# Patient Record
Sex: FEMALE
[De-identification: names, ages, dates, MRNs, and addresses within clinical notes are randomized per-mention and may not be internally consistent; named-entity substitution may affect disease eponyms.]

---

## 2024-09-04 ENCOUNTER — NON-APPOINTMENT (OUTPATIENT)
Age: 24
End: 2024-09-04

## 2024-09-04 ENCOUNTER — APPOINTMENT (OUTPATIENT)
Dept: OBGYN | Facility: CLINIC | Age: 24
End: 2024-09-04
Payer: MEDICAID

## 2024-09-04 ENCOUNTER — OUTPATIENT (OUTPATIENT)
Dept: OUTPATIENT SERVICES | Facility: HOSPITAL | Age: 24
LOS: 1 days | End: 2024-09-04
Payer: MEDICAID

## 2024-09-04 VITALS
TEMPERATURE: 97.7 F | DIASTOLIC BLOOD PRESSURE: 79 MMHG | HEART RATE: 77 BPM | RESPIRATION RATE: 18 BRPM | OXYGEN SATURATION: 99 % | HEIGHT: 61 IN | SYSTOLIC BLOOD PRESSURE: 123 MMHG | BODY MASS INDEX: 21.14 KG/M2 | WEIGHT: 112 LBS

## 2024-09-04 DIAGNOSIS — Z34.00 ENCOUNTER FOR SUPERVISION OF NORMAL FIRST PREGNANCY, UNSPECIFIED TRIMESTER: ICD-10-CM

## 2024-09-04 DIAGNOSIS — Z34.91 ENCOUNTER FOR SUPERVISION OF NORMAL PREGNANCY, UNSPECIFIED, FIRST TRIMESTER: ICD-10-CM

## 2024-09-04 DIAGNOSIS — L30.9 DERMATITIS, UNSPECIFIED: ICD-10-CM

## 2024-09-04 DIAGNOSIS — D51.9 VITAMIN B12 DEFICIENCY ANEMIA, UNSPECIFIED: ICD-10-CM

## 2024-09-04 DIAGNOSIS — Z81.8 FAMILY HISTORY OF OTHER MENTAL AND BEHAVIORAL DISORDERS: ICD-10-CM

## 2024-09-04 PROBLEM — Z00.00 ENCOUNTER FOR PREVENTIVE HEALTH EXAMINATION: Status: ACTIVE | Noted: 2024-09-04

## 2024-09-04 PROCEDURE — 99203 OFFICE O/P NEW LOW 30 MIN: CPT

## 2024-09-04 PROCEDURE — 80053 COMPREHEN METABOLIC PANEL: CPT

## 2024-09-04 PROCEDURE — 86778 TOXOPLASMA ANTIBODY IGM: CPT

## 2024-09-04 PROCEDURE — 87389 HIV-1 AG W/HIV-1&-2 AB AG IA: CPT

## 2024-09-04 PROCEDURE — 81003 URINALYSIS AUTO W/O SCOPE: CPT

## 2024-09-04 PROCEDURE — 86480 TB TEST CELL IMMUN MEASURE: CPT

## 2024-09-04 PROCEDURE — 87340 HEPATITIS B SURFACE AG IA: CPT

## 2024-09-04 PROCEDURE — 83020 HEMOGLOBIN ELECTROPHORESIS: CPT

## 2024-09-04 PROCEDURE — 83036 HEMOGLOBIN GLYCOSYLATED A1C: CPT

## 2024-09-04 PROCEDURE — 36415 COLL VENOUS BLD VENIPUNCTURE: CPT

## 2024-09-04 PROCEDURE — 85025 COMPLETE CBC W/AUTO DIFF WBC: CPT

## 2024-09-04 PROCEDURE — 86900 BLOOD TYPING SEROLOGIC ABO: CPT

## 2024-09-04 PROCEDURE — 81243 FMR1 GEN ALY DETC ABNL ALLEL: CPT

## 2024-09-04 PROCEDURE — 83655 ASSAY OF LEAD: CPT

## 2024-09-04 PROCEDURE — 86850 RBC ANTIBODY SCREEN: CPT

## 2024-09-04 PROCEDURE — 84443 ASSAY THYROID STIM HORMONE: CPT

## 2024-09-04 PROCEDURE — 87086 URINE CULTURE/COLONY COUNT: CPT

## 2024-09-04 PROCEDURE — 81025 URINE PREGNANCY TEST: CPT

## 2024-09-04 PROCEDURE — 86747 PARVOVIRUS ANTIBODY: CPT

## 2024-09-04 PROCEDURE — 81220 CFTR GENE COM VARIANTS: CPT

## 2024-09-04 PROCEDURE — 81329 SMN1 GENE DOS/DELETION ALYS: CPT

## 2024-09-04 PROCEDURE — G0463: CPT

## 2024-09-04 PROCEDURE — 86765 RUBEOLA ANTIBODY: CPT

## 2024-09-04 PROCEDURE — 86777 TOXOPLASMA ANTIBODY: CPT

## 2024-09-04 PROCEDURE — 86735 MUMPS ANTIBODY: CPT

## 2024-09-04 PROCEDURE — 86762 RUBELLA ANTIBODY: CPT

## 2024-09-04 PROCEDURE — 86787 VARICELLA-ZOSTER ANTIBODY: CPT

## 2024-09-04 PROCEDURE — 81420 FETAL CHRMOML ANEUPLOIDY: CPT

## 2024-09-04 PROCEDURE — 86803 HEPATITIS C AB TEST: CPT

## 2024-09-04 PROCEDURE — 86780 TREPONEMA PALLIDUM: CPT

## 2024-09-04 RX ORDER — PNV NO.95/FERROUS FUM/FOLIC AC 28MG-0.8MG
TABLET ORAL
Refills: 0 | Status: ACTIVE | COMMUNITY

## 2024-09-04 RX ORDER — PYRIDOXINE HCL (VITAMIN B6) 25 MG
25 TABLET ORAL EVERY 8 HOURS
Qty: 45 | Refills: 0 | Status: ACTIVE | COMMUNITY
Start: 2024-09-04 | End: 1900-01-01

## 2024-09-04 RX ORDER — METOCLOPRAMIDE 5 MG/1
5 TABLET ORAL TWICE DAILY
Qty: 30 | Refills: 0 | Status: ACTIVE | COMMUNITY
Start: 2024-09-04 | End: 1900-01-01

## 2024-09-04 RX ORDER — .BETA.-CAROTENE, ASCORBIC ACID, CHOLECALCIFEROL, .ALPHA.-TOCOPHEROL ACETATE, DL-, THIAMINE MONONITRATE, RIBOFLAVIN, NIACINAMIDE, PYRIDOXINE HYDROCHLORIDE, FOLIC ACID, CYANOCOBALAMIN, CALCIUM PANTOTHENATE, CALCIUM CARBONATE, FERROUS FUMARATE, ZINC OXIDE, AND DOCUSATE SODIUM 1000; 100; 400; 30; 3; 3; 15; 20; 1; 12; 7; 200; 29; 20; 25 [IU]/1; MG/1; [IU]/1; [IU]/1; MG/1; MG/1; MG/1; MG/1; MG/1; UG/1; MG/1; MG/1; MG/1; MG/1; MG/1
29-1 TABLET, COATED ORAL DAILY
Qty: 30 | Refills: 11 | Status: ACTIVE | COMMUNITY
Start: 2024-09-04 | End: 1900-01-01

## 2024-09-05 LAB
ALBUMIN SERPL ELPH-MCNC: 4.5 G/DL
ALP BLD-CCNC: 64 U/L
ALT SERPL-CCNC: 7 U/L
ANION GAP SERPL CALC-SCNC: 13 MMOL/L
AST SERPL-CCNC: 16 U/L
BASOPHILS # BLD AUTO: 0.02 K/UL
BASOPHILS NFR BLD AUTO: 0.3 %
BILIRUB SERPL-MCNC: 0.8 MG/DL
BUN SERPL-MCNC: 7 MG/DL
CALCIUM SERPL-MCNC: 10 MG/DL
CHLORIDE SERPL-SCNC: 102 MMOL/L
CO2 SERPL-SCNC: 21 MMOL/L
CREAT SERPL-MCNC: 0.48 MG/DL
EGFR: 134 ML/MIN/1.73M2
EOSINOPHIL # BLD AUTO: 0.04 K/UL
EOSINOPHIL NFR BLD AUTO: 0.5 %
ESTIMATED AVERAGE GLUCOSE: 103 MG/DL
GLUCOSE SERPL-MCNC: 78 MG/DL
HBA1C MFR BLD HPLC: 5.2 %
HBV SURFACE AG SER QL: NONREACTIVE
HCT VFR BLD CALC: 38.1 %
HCV AB SER QL: NONREACTIVE
HCV S/CO RATIO: 0.08 S/CO
HGB A MFR BLD: 97.4 %
HGB A2 MFR BLD: 2.6 %
HGB BLD-MCNC: 13 G/DL
HGB FRACT BLD-IMP: NORMAL
HIV1+2 AB SPEC QL IA.RAPID: NONREACTIVE
IMM GRANULOCYTES NFR BLD AUTO: 0.5 %
LYMPHOCYTES # BLD AUTO: 1.35 K/UL
LYMPHOCYTES NFR BLD AUTO: 17.5 %
MAN DIFF?: NORMAL
MCHC RBC-ENTMCNC: 28.6 PG
MCHC RBC-ENTMCNC: 34.1 GM/DL
MCV RBC AUTO: 83.9 FL
MONOCYTES # BLD AUTO: 0.39 K/UL
MONOCYTES NFR BLD AUTO: 5.1 %
NEUTROPHILS # BLD AUTO: 5.86 K/UL
NEUTROPHILS NFR BLD AUTO: 76.1 %
PLATELET # BLD AUTO: 220 K/UL
POTASSIUM SERPL-SCNC: 3.9 MMOL/L
PROT SERPL-MCNC: 6.9 G/DL
RBC # BLD: 4.54 M/UL
RBC # FLD: 13.1 %
SODIUM SERPL-SCNC: 136 MMOL/L
T PALLIDUM AB SER QL IA: NEGATIVE
TSH SERPL-ACNC: 0.34 UIU/ML
WBC # FLD AUTO: 7.7 K/UL

## 2024-09-06 DIAGNOSIS — Z3A.10 10 WEEKS GESTATION OF PREGNANCY: ICD-10-CM

## 2024-09-06 LAB
ABO + RH PNL BLD: NORMAL
B19V IGG SER QL IA: 0.36 INDEX
B19V IGG+IGM SER-IMP: NEGATIVE
B19V IGG+IGM SER-IMP: NORMAL
B19V IGM FLD-ACNC: 0.2 INDEX
B19V IGM SER-ACNC: NEGATIVE
BACTERIA UR CULT: NORMAL
BLD GP AB SCN SERPL QL: NORMAL
FMR1 GENE MUT ANL BLD/T: NORMAL
LEAD BLD-MCNC: <1 UG/DL
MEV IGG FLD QL IA: 21.8 AU/ML
MEV IGG+IGM SER-IMP: POSITIVE
MUV AB SER-ACNC: POSITIVE
MUV IGG SER QL IA: 72.6 AU/ML
RUBV IGG FLD-ACNC: 0.58 INDEX
RUBV IGG SER-IMP: NEGATIVE
T GONDII AB SER-IMP: NEGATIVE
T GONDII AB SER-IMP: NEGATIVE
T GONDII IGG SER QL: <3 IU/ML
T GONDII IGM SER QL: <3 AU/ML
VZV AB TITR SER: POSITIVE
VZV IGG SER IF-ACNC: 589.6 INDEX

## 2024-09-09 LAB
M TB IFN-G BLD-IMP: NEGATIVE
QUANTIFERON TB PLUS MITOGEN MINUS NIL: >10 IU/ML
QUANTIFERON TB PLUS NIL: 0.03 IU/ML
QUANTIFERON TB PLUS TB1 MINUS NIL: 0 IU/ML
QUANTIFERON TB PLUS TB2 MINUS NIL: 0 IU/ML

## 2024-09-11 LAB — AR GENE MUT ANL BLD/T: NORMAL

## 2024-09-13 LAB
CFTR MUT TESTED BLD/T: NEGATIVE
CHROMOSOME13 INTERPRETATION: NORMAL
CHROMOSOME13 TEST RESULT: NORMAL
CHROMOSOME18 INTERPRETATION: NORMAL
CHROMOSOME18 TEST RESULT: NORMAL
CHROMOSOME21 INTERPRETATION: NORMAL
CHROMOSOME21 TEST RESULT: NORMAL
FETAL FRACTION: NORMAL
PERFORMANCE AND LIMITATIONS: NORMAL
SEX CHROMOSOME INTERPRETATION: NORMAL
SEX CHROMOSOME TEST RESULT: NORMAL
VERIFI PRENATAL TEST: NOT DETECTED

## 2024-09-18 ENCOUNTER — TRANSCRIPTION ENCOUNTER (OUTPATIENT)
Age: 24
End: 2024-09-18

## 2024-09-23 ENCOUNTER — APPOINTMENT (OUTPATIENT)
Dept: ANTEPARTUM | Facility: CLINIC | Age: 24
End: 2024-09-23
Payer: MEDICAID

## 2024-09-23 ENCOUNTER — ASOB RESULT (OUTPATIENT)
Age: 24
End: 2024-09-23

## 2024-09-23 PROCEDURE — 76813 OB US NUCHAL MEAS 1 GEST: CPT | Mod: 59

## 2024-09-23 PROCEDURE — 76801 OB US < 14 WKS SINGLE FETUS: CPT

## 2024-10-02 ENCOUNTER — OUTPATIENT (OUTPATIENT)
Dept: OUTPATIENT SERVICES | Facility: HOSPITAL | Age: 24
LOS: 1 days | End: 2024-10-02

## 2024-10-02 ENCOUNTER — APPOINTMENT (OUTPATIENT)
Dept: OBGYN | Facility: CLINIC | Age: 24
End: 2024-10-02
Payer: MEDICAID

## 2024-10-02 VITALS
DIASTOLIC BLOOD PRESSURE: 81 MMHG | SYSTOLIC BLOOD PRESSURE: 122 MMHG | TEMPERATURE: 98.1 F | BODY MASS INDEX: 21.14 KG/M2 | RESPIRATION RATE: 18 BRPM | HEIGHT: 61 IN | HEART RATE: 93 BPM | WEIGHT: 112 LBS | OXYGEN SATURATION: 99 %

## 2024-10-02 DIAGNOSIS — Z34.00 ENCOUNTER FOR SUPERVISION OF NORMAL FIRST PREGNANCY, UNSPECIFIED TRIMESTER: ICD-10-CM

## 2024-10-02 PROCEDURE — 99213 OFFICE O/P EST LOW 20 MIN: CPT

## 2024-10-02 PROCEDURE — 81003 URINALYSIS AUTO W/O SCOPE: CPT

## 2024-10-02 PROCEDURE — G0463: CPT

## 2024-10-09 ENCOUNTER — NON-APPOINTMENT (OUTPATIENT)
Age: 24
End: 2024-10-09

## 2024-11-05 PROBLEM — Z78.9 OTHER SPECIFIED HEALTH STATUS: Chronic | Status: ACTIVE | Noted: 2024-11-04

## 2024-11-06 ENCOUNTER — OUTPATIENT (OUTPATIENT)
Dept: OUTPATIENT SERVICES | Facility: HOSPITAL | Age: 24
LOS: 1 days | End: 2024-11-06

## 2024-11-06 ENCOUNTER — APPOINTMENT (OUTPATIENT)
Dept: OBGYN | Facility: CLINIC | Age: 24
End: 2024-11-06
Payer: MEDICAID

## 2024-11-06 VITALS
BODY MASS INDEX: 20.77 KG/M2 | DIASTOLIC BLOOD PRESSURE: 83 MMHG | SYSTOLIC BLOOD PRESSURE: 122 MMHG | HEIGHT: 61 IN | HEART RATE: 105 BPM | OXYGEN SATURATION: 98 % | TEMPERATURE: 97.9 F | WEIGHT: 110 LBS

## 2024-11-06 DIAGNOSIS — O99.012 ANEMIA COMPLICATING PREGNANCY, SECOND TRIMESTER: ICD-10-CM

## 2024-11-06 DIAGNOSIS — Z34.00 ENCOUNTER FOR SUPERVISION OF NORMAL FIRST PREGNANCY, UNSPECIFIED TRIMESTER: ICD-10-CM

## 2024-11-06 PROCEDURE — G0463: CPT

## 2024-11-06 PROCEDURE — 99213 OFFICE O/P EST LOW 20 MIN: CPT

## 2024-11-06 PROCEDURE — 82105 ALPHA-FETOPROTEIN SERUM: CPT

## 2024-11-06 PROCEDURE — 81003 URINALYSIS AUTO W/O SCOPE: CPT

## 2024-11-07 LAB
AFP INTERP SERPL-IMP: NORMAL
AFP INTERP SERPL-IMP: NORMAL
AFP MOM CUT-OFF: 2.5
AFP MOM SERPL: 1.4
AFP PERCENTILE: 85.3
AFP SERPL-ACNC: 92.22 NG/ML
CARBAMAZEPINE?: NO
CURRENT SMOKER: NORMAL
DIABETES STATUS PATIENT: NORMAL
GA: NORMAL
GESTATIONAL AGE METHOD: NORMAL
HX OF NTD NARR: NORMAL
MULTIPLE PREGNANCY: NORMAL
NEURAL TUBE DEFECT RISK FETUS: NORMAL
NEURAL TUBE DEFECT RISK POP: NORMAL
RECOM F/U: NO
TEST PERFORMANCE INFO SPEC: NORMAL
VALPROIC ACID?: NORMAL

## 2024-11-20 ENCOUNTER — ASOB RESULT (OUTPATIENT)
Age: 24
End: 2024-11-20

## 2024-11-20 ENCOUNTER — APPOINTMENT (OUTPATIENT)
Dept: ANTEPARTUM | Facility: CLINIC | Age: 24
End: 2024-11-20
Payer: MEDICAID

## 2024-11-20 ENCOUNTER — NON-APPOINTMENT (OUTPATIENT)
Age: 24
End: 2024-11-20

## 2024-11-20 PROCEDURE — 76817 TRANSVAGINAL US OBSTETRIC: CPT

## 2024-11-20 PROCEDURE — 76811 OB US DETAILED SNGL FETUS: CPT

## 2024-11-27 ENCOUNTER — ASOB RESULT (OUTPATIENT)
Age: 24
End: 2024-11-27

## 2024-11-27 ENCOUNTER — APPOINTMENT (OUTPATIENT)
Dept: ANTEPARTUM | Facility: CLINIC | Age: 24
End: 2024-11-27
Payer: MEDICAID

## 2024-11-27 PROCEDURE — 76815 OB US LIMITED FETUS(S): CPT

## 2024-11-27 PROCEDURE — 76817 TRANSVAGINAL US OBSTETRIC: CPT

## 2024-12-03 ENCOUNTER — NON-APPOINTMENT (OUTPATIENT)
Age: 24
End: 2024-12-03

## 2024-12-04 ENCOUNTER — APPOINTMENT (OUTPATIENT)
Dept: OBGYN | Facility: CLINIC | Age: 24
End: 2024-12-04
Payer: MEDICAID

## 2024-12-04 ENCOUNTER — OUTPATIENT (OUTPATIENT)
Dept: OUTPATIENT SERVICES | Facility: HOSPITAL | Age: 24
LOS: 1 days | End: 2024-12-04
Payer: MEDICAID

## 2024-12-04 VITALS
TEMPERATURE: 98.1 F | HEART RATE: 95 BPM | RESPIRATION RATE: 18 BRPM | SYSTOLIC BLOOD PRESSURE: 112 MMHG | BODY MASS INDEX: 22.47 KG/M2 | WEIGHT: 119 LBS | HEIGHT: 61 IN | OXYGEN SATURATION: 95 % | DIASTOLIC BLOOD PRESSURE: 71 MMHG

## 2024-12-04 DIAGNOSIS — Z34.00 ENCOUNTER FOR SUPERVISION OF NORMAL FIRST PREGNANCY, UNSPECIFIED TRIMESTER: ICD-10-CM

## 2024-12-04 PROCEDURE — G0463: CPT

## 2024-12-04 PROCEDURE — 99213 OFFICE O/P EST LOW 20 MIN: CPT

## 2024-12-04 PROCEDURE — 81003 URINALYSIS AUTO W/O SCOPE: CPT

## 2024-12-05 DIAGNOSIS — Z34.92 ENCOUNTER FOR SUPERVISION OF NORMAL PREGNANCY, UNSPECIFIED, SECOND TRIMESTER: ICD-10-CM

## 2024-12-05 DIAGNOSIS — Z3A.28 28 WEEKS GESTATION OF PREGNANCY: ICD-10-CM

## 2025-01-03 ENCOUNTER — OUTPATIENT (OUTPATIENT)
Dept: OUTPATIENT SERVICES | Facility: HOSPITAL | Age: 25
LOS: 1 days | End: 2025-01-03
Payer: MEDICAID

## 2025-01-03 ENCOUNTER — APPOINTMENT (OUTPATIENT)
Dept: OBGYN | Facility: CLINIC | Age: 25
End: 2025-01-03
Payer: MEDICAID

## 2025-01-03 VITALS
TEMPERATURE: 97.1 F | HEIGHT: 61 IN | HEART RATE: 88 BPM | OXYGEN SATURATION: 100 % | SYSTOLIC BLOOD PRESSURE: 111 MMHG | WEIGHT: 122 LBS | RESPIRATION RATE: 18 BRPM | DIASTOLIC BLOOD PRESSURE: 70 MMHG | BODY MASS INDEX: 23.03 KG/M2

## 2025-01-03 DIAGNOSIS — Z87.2 PERSONAL HISTORY OF DISEASES OF THE SKIN AND SUBCUTANEOUS TISSUE: ICD-10-CM

## 2025-01-03 DIAGNOSIS — O99.012 ANEMIA COMPLICATING PREGNANCY, SECOND TRIMESTER: ICD-10-CM

## 2025-01-03 DIAGNOSIS — Z00.00 ENCOUNTER FOR GENERAL ADULT MEDICAL EXAMINATION WITHOUT ABNORMAL FINDINGS: ICD-10-CM

## 2025-01-03 DIAGNOSIS — Z86.2 PERSONAL HISTORY OF DISEASES OF THE BLOOD AND BLOOD-FORMING ORGANS AND CERTAIN DISORDERS INVOLVING THE IMMUNE MECHANISM: ICD-10-CM

## 2025-01-03 DIAGNOSIS — Z71.85 ENCOUNTER FOR IMMUNIZATION SAFETY COUNSELING: ICD-10-CM

## 2025-01-03 DIAGNOSIS — Z34.91 ENCOUNTER FOR SUPERVISION OF NORMAL PREGNANCY, UNSPECIFIED, FIRST TRIMESTER: ICD-10-CM

## 2025-01-03 PROCEDURE — G0463: CPT

## 2025-01-03 PROCEDURE — 36415 COLL VENOUS BLD VENIPUNCTURE: CPT

## 2025-01-03 PROCEDURE — 82950 GLUCOSE TEST: CPT

## 2025-01-03 PROCEDURE — 81003 URINALYSIS AUTO W/O SCOPE: CPT

## 2025-01-03 PROCEDURE — 83036 HEMOGLOBIN GLYCOSYLATED A1C: CPT

## 2025-01-03 PROCEDURE — 86780 TREPONEMA PALLIDUM: CPT

## 2025-01-03 PROCEDURE — 85025 COMPLETE CBC W/AUTO DIFF WBC: CPT

## 2025-01-03 PROCEDURE — 99214 OFFICE O/P EST MOD 30 MIN: CPT

## 2025-01-06 LAB
BASOPHILS # BLD AUTO: 0.01 K/UL
BASOPHILS NFR BLD AUTO: 0.1 %
EOSINOPHIL # BLD AUTO: 0.09 K/UL
EOSINOPHIL NFR BLD AUTO: 1.2 %
ESTIMATED AVERAGE GLUCOSE: 91 MG/DL
GLUCOSE 1H P 50 G GLC PO SERPL-MCNC: 106 MG/DL
HBA1C MFR BLD HPLC: 4.8 %
HCT VFR BLD CALC: 33.7 %
HGB BLD-MCNC: 11.3 G/DL
IMM GRANULOCYTES NFR BLD AUTO: 1.2 %
LYMPHOCYTES # BLD AUTO: 1.24 K/UL
LYMPHOCYTES NFR BLD AUTO: 16.9 %
MAN DIFF?: NORMAL
MCHC RBC-ENTMCNC: 29.5 PG
MCHC RBC-ENTMCNC: 33.5 G/DL
MCV RBC AUTO: 88 FL
MONOCYTES # BLD AUTO: 0.39 K/UL
MONOCYTES NFR BLD AUTO: 5.3 %
NEUTROPHILS # BLD AUTO: 5.51 K/UL
NEUTROPHILS NFR BLD AUTO: 75.3 %
PLATELET # BLD AUTO: 213 K/UL
RBC # BLD: 3.83 M/UL
RBC # FLD: 13 %
T PALLIDUM AB SER QL IA: NEGATIVE
WBC # FLD AUTO: 7.33 K/UL

## 2025-01-07 DIAGNOSIS — O99.012 ANEMIA COMPLICATING PREGNANCY, SECOND TRIMESTER: ICD-10-CM

## 2025-01-07 DIAGNOSIS — Z3A.27 27 WEEKS GESTATION OF PREGNANCY: ICD-10-CM

## 2025-02-05 ENCOUNTER — APPOINTMENT (OUTPATIENT)
Dept: ANTEPARTUM | Facility: CLINIC | Age: 25
End: 2025-02-05
Payer: MEDICAID

## 2025-02-05 ENCOUNTER — ASOB RESULT (OUTPATIENT)
Age: 25
End: 2025-02-05

## 2025-02-05 ENCOUNTER — APPOINTMENT (OUTPATIENT)
Dept: OBGYN | Facility: CLINIC | Age: 25
End: 2025-02-05
Payer: MEDICAID

## 2025-02-05 ENCOUNTER — OUTPATIENT (OUTPATIENT)
Dept: OUTPATIENT SERVICES | Facility: HOSPITAL | Age: 25
LOS: 1 days | End: 2025-02-05
Payer: MEDICAID

## 2025-02-05 VITALS
SYSTOLIC BLOOD PRESSURE: 108 MMHG | RESPIRATION RATE: 18 BRPM | BODY MASS INDEX: 24.35 KG/M2 | HEART RATE: 95 BPM | TEMPERATURE: 97.7 F | OXYGEN SATURATION: 99 % | WEIGHT: 129 LBS | HEIGHT: 61 IN | DIASTOLIC BLOOD PRESSURE: 68 MMHG

## 2025-02-05 DIAGNOSIS — Z34.00 ENCOUNTER FOR SUPERVISION OF NORMAL FIRST PREGNANCY, UNSPECIFIED TRIMESTER: ICD-10-CM

## 2025-02-05 DIAGNOSIS — Z23 ENCOUNTER FOR IMMUNIZATION: ICD-10-CM

## 2025-02-05 DIAGNOSIS — O99.012 ANEMIA COMPLICATING PREGNANCY, SECOND TRIMESTER: ICD-10-CM

## 2025-02-05 PROCEDURE — 87389 HIV-1 AG W/HIV-1&-2 AB AG IA: CPT

## 2025-02-05 PROCEDURE — 90715 TDAP VACCINE 7 YRS/> IM: CPT

## 2025-02-05 PROCEDURE — 85025 COMPLETE CBC W/AUTO DIFF WBC: CPT

## 2025-02-05 PROCEDURE — 99213 OFFICE O/P EST LOW 20 MIN: CPT

## 2025-02-05 PROCEDURE — 76816 OB US FOLLOW-UP PER FETUS: CPT

## 2025-02-05 PROCEDURE — G0463: CPT

## 2025-02-05 PROCEDURE — 90715 TDAP VACCINE 7 YRS/> IM: CPT | Mod: NC

## 2025-02-05 PROCEDURE — 81003 URINALYSIS AUTO W/O SCOPE: CPT

## 2025-02-05 PROCEDURE — 76819 FETAL BIOPHYS PROFIL W/O NST: CPT | Mod: 59

## 2025-02-06 LAB — HIV1+2 AB SPEC QL IA.RAPID: NONREACTIVE

## 2025-02-10 DIAGNOSIS — Z23 ENCOUNTER FOR IMMUNIZATION: ICD-10-CM

## 2025-02-10 DIAGNOSIS — Z3A.32 32 WEEKS GESTATION OF PREGNANCY: ICD-10-CM

## 2025-02-10 DIAGNOSIS — O99.012 ANEMIA COMPLICATING PREGNANCY, SECOND TRIMESTER: ICD-10-CM

## 2025-02-12 ENCOUNTER — NON-APPOINTMENT (OUTPATIENT)
Age: 25
End: 2025-02-12

## 2025-02-21 ENCOUNTER — APPOINTMENT (OUTPATIENT)
Dept: OBGYN | Facility: CLINIC | Age: 25
End: 2025-02-21
Payer: MEDICAID

## 2025-02-21 VITALS
HEART RATE: 97 BPM | BODY MASS INDEX: 25.13 KG/M2 | WEIGHT: 133 LBS | SYSTOLIC BLOOD PRESSURE: 126 MMHG | DIASTOLIC BLOOD PRESSURE: 87 MMHG | OXYGEN SATURATION: 98 %

## 2025-02-21 PROCEDURE — 0502F SUBSEQUENT PRENATAL CARE: CPT

## 2025-03-07 ENCOUNTER — APPOINTMENT (OUTPATIENT)
Dept: OBGYN | Facility: CLINIC | Age: 25
End: 2025-03-07
Payer: MEDICAID

## 2025-03-07 ENCOUNTER — LABORATORY RESULT (OUTPATIENT)
Age: 25
End: 2025-03-07

## 2025-03-07 ENCOUNTER — NON-APPOINTMENT (OUTPATIENT)
Age: 25
End: 2025-03-07

## 2025-03-07 VITALS
OXYGEN SATURATION: 99 % | DIASTOLIC BLOOD PRESSURE: 85 MMHG | HEART RATE: 90 BPM | BODY MASS INDEX: 25.13 KG/M2 | WEIGHT: 133 LBS | SYSTOLIC BLOOD PRESSURE: 126 MMHG

## 2025-03-07 PROCEDURE — 0502F SUBSEQUENT PRENATAL CARE: CPT

## 2025-03-14 ENCOUNTER — APPOINTMENT (OUTPATIENT)
Dept: OBGYN | Facility: CLINIC | Age: 25
End: 2025-03-14
Payer: MEDICAID

## 2025-03-14 VITALS
DIASTOLIC BLOOD PRESSURE: 83 MMHG | SYSTOLIC BLOOD PRESSURE: 122 MMHG | HEART RATE: 97 BPM | WEIGHT: 134 LBS | BODY MASS INDEX: 25.32 KG/M2 | OXYGEN SATURATION: 98 %

## 2025-03-14 PROCEDURE — 0502F SUBSEQUENT PRENATAL CARE: CPT

## 2025-03-25 ENCOUNTER — APPOINTMENT (OUTPATIENT)
Dept: OBGYN | Facility: CLINIC | Age: 25
End: 2025-03-25
Payer: MEDICAID

## 2025-03-25 VITALS — BODY MASS INDEX: 26.45 KG/M2 | WEIGHT: 140 LBS | SYSTOLIC BLOOD PRESSURE: 145 MMHG | DIASTOLIC BLOOD PRESSURE: 93 MMHG

## 2025-03-25 PROCEDURE — 0502F SUBSEQUENT PRENATAL CARE: CPT

## 2025-03-28 ENCOUNTER — NON-APPOINTMENT (OUTPATIENT)
Age: 25
End: 2025-03-28

## 2025-03-31 ENCOUNTER — APPOINTMENT (OUTPATIENT)
Dept: OBGYN | Facility: CLINIC | Age: 25
End: 2025-03-31
Payer: MEDICAID

## 2025-03-31 ENCOUNTER — INPATIENT (INPATIENT)
Facility: HOSPITAL | Age: 25
LOS: 2 days | Discharge: HOME | DRG: 951 | End: 2025-04-03
Attending: OBSTETRICS & GYNECOLOGY | Admitting: OBSTETRICS & GYNECOLOGY
Payer: MEDICAID

## 2025-03-31 VITALS
HEIGHT: 61 IN | DIASTOLIC BLOOD PRESSURE: 79 MMHG | OXYGEN SATURATION: 95 % | RESPIRATION RATE: 17 BRPM | SYSTOLIC BLOOD PRESSURE: 137 MMHG | WEIGHT: 141.98 LBS | TEMPERATURE: 98 F | HEART RATE: 81 BPM

## 2025-03-31 VITALS
DIASTOLIC BLOOD PRESSURE: 95 MMHG | WEIGHT: 142 LBS | BODY MASS INDEX: 26.83 KG/M2 | OXYGEN SATURATION: 99 % | SYSTOLIC BLOOD PRESSURE: 143 MMHG | HEART RATE: 71 BPM

## 2025-03-31 DIAGNOSIS — Z34.80 ENCOUNTER FOR SUPERVISION OF OTHER NORMAL PREGNANCY, UNSPECIFIED TRIMESTER: ICD-10-CM

## 2025-03-31 DIAGNOSIS — O26.899 OTHER SPECIFIED PREGNANCY RELATED CONDITIONS, UNSPECIFIED TRIMESTER: ICD-10-CM

## 2025-03-31 DIAGNOSIS — Z98.890 OTHER SPECIFIED POSTPROCEDURAL STATES: Chronic | ICD-10-CM

## 2025-03-31 LAB
ALBUMIN SERPL ELPH-MCNC: 2.5 G/DL — LOW (ref 3.5–5)
ALP SERPL-CCNC: 285 U/L — HIGH (ref 40–120)
ALT FLD-CCNC: 20 U/L DA — SIGNIFICANT CHANGE UP (ref 10–60)
ANION GAP SERPL CALC-SCNC: 7 MMOL/L — SIGNIFICANT CHANGE UP (ref 5–17)
APPEARANCE UR: CLEAR — SIGNIFICANT CHANGE UP
APTT BLD: 33.2 SEC — SIGNIFICANT CHANGE UP (ref 24.5–35.6)
AST SERPL-CCNC: 24 U/L — SIGNIFICANT CHANGE UP (ref 10–40)
BASOPHILS # BLD AUTO: 0.02 K/UL — SIGNIFICANT CHANGE UP (ref 0–0.2)
BASOPHILS NFR BLD AUTO: 0.3 % — SIGNIFICANT CHANGE UP (ref 0–2)
BILIRUB SERPL-MCNC: 0.4 MG/DL — SIGNIFICANT CHANGE UP (ref 0.2–1.2)
BILIRUB UR-MCNC: NEGATIVE — SIGNIFICANT CHANGE UP
BUN SERPL-MCNC: 13 MG/DL — SIGNIFICANT CHANGE UP (ref 7–18)
CALCIUM SERPL-MCNC: 8.7 MG/DL — SIGNIFICANT CHANGE UP (ref 8.4–10.5)
CHLORIDE SERPL-SCNC: 108 MMOL/L — SIGNIFICANT CHANGE UP (ref 96–108)
CO2 SERPL-SCNC: 23 MMOL/L — SIGNIFICANT CHANGE UP (ref 22–31)
COLOR SPEC: YELLOW — SIGNIFICANT CHANGE UP
CREAT ?TM UR-MCNC: 177 MG/DL — SIGNIFICANT CHANGE UP
CREAT SERPL-MCNC: 0.55 MG/DL — SIGNIFICANT CHANGE UP (ref 0.5–1.3)
DIFF PNL FLD: NEGATIVE — SIGNIFICANT CHANGE UP
EGFR: 130 ML/MIN/1.73M2 — SIGNIFICANT CHANGE UP
EGFR: 130 ML/MIN/1.73M2 — SIGNIFICANT CHANGE UP
EOSINOPHIL # BLD AUTO: 0.06 K/UL — SIGNIFICANT CHANGE UP (ref 0–0.5)
EOSINOPHIL NFR BLD AUTO: 1 % — SIGNIFICANT CHANGE UP (ref 0–6)
FIBRINOGEN PPP-MCNC: 332 MG/DL — SIGNIFICANT CHANGE UP (ref 200–475)
GLUCOSE SERPL-MCNC: 98 MG/DL — SIGNIFICANT CHANGE UP (ref 70–99)
GLUCOSE UR QL: NEGATIVE MG/DL — SIGNIFICANT CHANGE UP
HCT VFR BLD CALC: 36 % — SIGNIFICANT CHANGE UP (ref 34.5–45)
HGB BLD-MCNC: 12.3 G/DL — SIGNIFICANT CHANGE UP (ref 11.5–15.5)
IMM GRANULOCYTES NFR BLD AUTO: 0.7 % — SIGNIFICANT CHANGE UP (ref 0–0.9)
INR BLD: 0.89 RATIO — SIGNIFICANT CHANGE UP (ref 0.85–1.16)
KETONES UR-MCNC: NEGATIVE MG/DL — SIGNIFICANT CHANGE UP
LDH SERPL L TO P-CCNC: 205 U/L — SIGNIFICANT CHANGE UP (ref 120–225)
LEUKOCYTE ESTERASE UR-ACNC: ABNORMAL
LYMPHOCYTES # BLD AUTO: 1.61 K/UL — SIGNIFICANT CHANGE UP (ref 1–3.3)
LYMPHOCYTES # BLD AUTO: 28 % — SIGNIFICANT CHANGE UP (ref 13–44)
MCHC RBC-ENTMCNC: 28.5 PG — SIGNIFICANT CHANGE UP (ref 27–34)
MCHC RBC-ENTMCNC: 34.2 G/DL — SIGNIFICANT CHANGE UP (ref 32–36)
MCV RBC AUTO: 83.5 FL — SIGNIFICANT CHANGE UP (ref 80–100)
MONOCYTES # BLD AUTO: 0.28 K/UL — SIGNIFICANT CHANGE UP (ref 0–0.9)
MONOCYTES NFR BLD AUTO: 4.9 % — SIGNIFICANT CHANGE UP (ref 2–14)
NEUTROPHILS # BLD AUTO: 3.73 K/UL — SIGNIFICANT CHANGE UP (ref 1.8–7.4)
NEUTROPHILS NFR BLD AUTO: 65.1 % — SIGNIFICANT CHANGE UP (ref 43–77)
NITRITE UR-MCNC: NEGATIVE — SIGNIFICANT CHANGE UP
NRBC BLD AUTO-RTO: 0 /100 WBCS — SIGNIFICANT CHANGE UP (ref 0–0)
PH UR: 7 — SIGNIFICANT CHANGE UP (ref 5–8)
PLATELET # BLD AUTO: 155 K/UL — SIGNIFICANT CHANGE UP (ref 150–400)
POTASSIUM SERPL-MCNC: 3.7 MMOL/L — SIGNIFICANT CHANGE UP (ref 3.5–5.3)
POTASSIUM SERPL-SCNC: 3.7 MMOL/L — SIGNIFICANT CHANGE UP (ref 3.5–5.3)
PROT ?TM UR-MCNC: 189 MG/DL — HIGH (ref 0–12)
PROT SERPL-MCNC: 6 G/DL — SIGNIFICANT CHANGE UP (ref 6–8.3)
PROT UR-MCNC: 300 MG/DL
PROT/CREAT UR-RTO: 1.1 RATIO — HIGH (ref 0–0.2)
PROTHROM AB SERPL-ACNC: 10.3 SEC — SIGNIFICANT CHANGE UP (ref 9.9–13.4)
RBC # BLD: 4.31 M/UL — SIGNIFICANT CHANGE UP (ref 3.8–5.2)
RBC # FLD: 15 % — HIGH (ref 10.3–14.5)
SODIUM SERPL-SCNC: 138 MMOL/L — SIGNIFICANT CHANGE UP (ref 135–145)
SP GR SPEC: 1.02 — SIGNIFICANT CHANGE UP (ref 1–1.03)
URATE SERPL-MCNC: 5.1 MG/DL — SIGNIFICANT CHANGE UP (ref 2.5–7)
UROBILINOGEN FLD QL: 1 MG/DL — SIGNIFICANT CHANGE UP (ref 0.2–1)
WBC # BLD: 5.74 K/UL — SIGNIFICANT CHANGE UP (ref 3.8–10.5)
WBC # FLD AUTO: 5.74 K/UL — SIGNIFICANT CHANGE UP (ref 3.8–10.5)

## 2025-03-31 PROCEDURE — 59425 ANTEPARTUM CARE ONLY: CPT

## 2025-03-31 PROCEDURE — 0502F SUBSEQUENT PRENATAL CARE: CPT

## 2025-03-31 RX ORDER — MAGNESIUM SULFATE 500 MG/ML
4 SYRINGE (ML) INJECTION ONCE
Refills: 0 | Status: COMPLETED | OUTPATIENT
Start: 2025-03-31 | End: 2025-03-31

## 2025-03-31 RX ORDER — SODIUM CHLORIDE 9 G/1000ML
1000 INJECTION, SOLUTION INTRAVENOUS
Refills: 0 | Status: DISCONTINUED | OUTPATIENT
Start: 2025-03-31 | End: 2025-03-31

## 2025-03-31 RX ORDER — OXYTOCIN-SODIUM CHLORIDE 0.9% IV SOLN 30 UNIT/500ML 30-0.9/5 UT/ML-%
SOLUTION INTRAVENOUS
Qty: 30 | Refills: 0 | Status: DISCONTINUED | OUTPATIENT
Start: 2025-03-31 | End: 2025-04-01

## 2025-03-31 RX ORDER — CITRIC ACID/SODIUM CITRATE 300-500 MG
15 SOLUTION, ORAL ORAL EVERY 6 HOURS
Refills: 0 | Status: DISCONTINUED | OUTPATIENT
Start: 2025-03-31 | End: 2025-04-01

## 2025-03-31 RX ORDER — INFLUENZA A VIRUS A/IDAHO/07/2018 (H1N1) ANTIGEN (MDCK CELL DERIVED, PROPIOLACTONE INACTIVATED, INFLUENZA A VIRUS A/INDIANA/08/2018 (H3N2) ANTIGEN (MDCK CELL DERIVED, PROPIOLACTONE INACTIVATED), INFLUENZA B VIRUS B/SINGAPORE/INFTT-16-0610/2016 ANTIGEN (MDCK CELL DERIVED, PROPIOLACTONE INACTIVATED), INFLUENZA B VIRUS B/IOWA/06/2017 ANTIGEN (MDCK CELL DERIVED, PROPIOLACTONE INACTIVATED) 15; 15; 15; 15 UG/.5ML; UG/.5ML; UG/.5ML; UG/.5ML
0.5 INJECTION, SUSPENSION INTRAMUSCULAR ONCE
Refills: 0 | Status: DISCONTINUED | OUTPATIENT
Start: 2025-03-31 | End: 2025-04-03

## 2025-03-31 RX ORDER — SODIUM CHLORIDE 9 G/1000ML
1000 INJECTION, SOLUTION INTRAVENOUS
Refills: 0 | Status: DISCONTINUED | OUTPATIENT
Start: 2025-03-31 | End: 2025-04-03

## 2025-03-31 RX ORDER — OXYTOCIN-SODIUM CHLORIDE 0.9% IV SOLN 30 UNIT/500ML 30-0.9/5 UT/ML-%
167 SOLUTION INTRAVENOUS
Qty: 30 | Refills: 0 | Status: DISCONTINUED | OUTPATIENT
Start: 2025-03-31 | End: 2025-04-02

## 2025-03-31 RX ORDER — MAGNESIUM SULFATE 500 MG/ML
2 SYRINGE (ML) INJECTION
Qty: 40 | Refills: 0 | Status: DISCONTINUED | OUTPATIENT
Start: 2025-03-31 | End: 2025-04-02

## 2025-03-31 RX ADMIN — OXYTOCIN-SODIUM CHLORIDE 0.9% IV SOLN 30 UNIT/500ML 2 MILLIUNIT(S)/MIN: 30-0.9/5 SOLUTION at 21:43

## 2025-03-31 RX ADMIN — SODIUM CHLORIDE 125 MILLILITER(S): 9 INJECTION, SOLUTION INTRAVENOUS at 12:49

## 2025-03-31 RX ADMIN — SODIUM CHLORIDE 125 MILLILITER(S): 9 INJECTION, SOLUTION INTRAVENOUS at 21:45

## 2025-03-31 RX ADMIN — Medication 300 GRAM(S): at 22:25

## 2025-03-31 RX ADMIN — Medication 50 GM/HR: at 22:45

## 2025-03-31 NOTE — OB PROVIDER H&P - NSHPPHYSICALEXAM_GEN_ALL_CORE
146/96    Gen: NAD  Abd: soft, NT, gravid  Ext: no calf tenderness    VE: 2/70/-2    FHT: 150 moderate variability with acces no decels  Kennett: CTX q 3-5 min    BSUS: vertex, anterior placenta    EFW 3300g

## 2025-03-31 NOTE — OB PROVIDER H&P - NSICDXFAMILYHX_GEN_ALL_CORE_FT
FAMILY HISTORY:  Sibling  Still living? Unknown  Family history of autism, Age at diagnosis: Age Unknown

## 2025-03-31 NOTE — OB PROVIDER H&P - HISTORY OF PRESENT ILLNESS
23 yo  at 39.6 wks GA EDC  LMP  presents from office with /95, last week on 3/25 /93 with pproteinuia in office now meeting criteria for PEC w/o SF for IOL. Denies HTN prior to pregnancy. No headache, visual changes. No chest pain or SOB. No N/V. No RUQ/epigastric pain. +UE/Le edema. Denies CTX. no LOF/VB. +FM    PNC With Dr. Tiwari with EC w/o SF as above    POBhx:  denies    PGYNHx: no abnl PAP, no C/F or STI    PMHx: eczema per chart review    PSHx: rhinoplasty     Meds: denies    Allergies: NKDA    SHx: no T/E/D    FHx: brother with autism    ROS: as above

## 2025-03-31 NOTE — OB PROVIDER H&P - ASSESSMENT
23 yo  at 39.6 wks GA IOL for PE w/o SF, GBS neg, cat 1 FHT, mild range BP, asx  - admit to L&D  - fetal monitoring  - IVF  - CBC, type and screen x 2, coags, fibrinogen, RPR  - BP monitoring  - PEC labs  - IOL with PO cytotec  - plan per Dr. Tiwari  - consent to be signed by Dr. Calix (attg in house) 25 yo  at 39.6 wks GA IOL for PE w/o SF, GBS neg, cat 1 FHT, mild range BP, asx  - admit to L&D  - fetal monitoring  - IVF  - CBC, type and screen x 2, coags, fibrinogen, RPR  - BP monitoring  - PEC labs  - IOL with PO cytotec  - plan per Dr. Tiwari  - consent to be signed by Dr. Calix (attg in house)  - MMR PP

## 2025-04-01 ENCOUNTER — NON-APPOINTMENT (OUTPATIENT)
Age: 25
End: 2025-04-01

## 2025-04-01 LAB
ALBUMIN SERPL ELPH-MCNC: 2.4 G/DL — LOW (ref 3.5–5)
ALBUMIN SERPL ELPH-MCNC: 2.4 G/DL — LOW (ref 3.5–5)
ALP SERPL-CCNC: 284 U/L — HIGH (ref 40–120)
ALP SERPL-CCNC: 292 U/L — HIGH (ref 40–120)
ALT FLD-CCNC: 18 U/L DA — SIGNIFICANT CHANGE UP (ref 10–60)
ALT FLD-CCNC: 21 U/L DA — SIGNIFICANT CHANGE UP (ref 10–60)
ANION GAP SERPL CALC-SCNC: 10 MMOL/L — SIGNIFICANT CHANGE UP (ref 5–17)
ANION GAP SERPL CALC-SCNC: 8 MMOL/L — SIGNIFICANT CHANGE UP (ref 5–17)
APTT BLD: 30.3 SEC — SIGNIFICANT CHANGE UP (ref 24.5–35.6)
AST SERPL-CCNC: 25 U/L — SIGNIFICANT CHANGE UP (ref 10–40)
AST SERPL-CCNC: 28 U/L — SIGNIFICANT CHANGE UP (ref 10–40)
BASOPHILS # BLD AUTO: 0.02 K/UL — SIGNIFICANT CHANGE UP (ref 0–0.2)
BASOPHILS # BLD AUTO: 0.05 K/UL — SIGNIFICANT CHANGE UP (ref 0–0.2)
BASOPHILS NFR BLD AUTO: 0.2 % — SIGNIFICANT CHANGE UP (ref 0–2)
BASOPHILS NFR BLD AUTO: 0.3 % — SIGNIFICANT CHANGE UP (ref 0–2)
BILIRUB SERPL-MCNC: 0.4 MG/DL — SIGNIFICANT CHANGE UP (ref 0.2–1.2)
BILIRUB SERPL-MCNC: 0.5 MG/DL — SIGNIFICANT CHANGE UP (ref 0.2–1.2)
BUN SERPL-MCNC: 11 MG/DL — SIGNIFICANT CHANGE UP (ref 7–18)
BUN SERPL-MCNC: 12 MG/DL — SIGNIFICANT CHANGE UP (ref 7–18)
CALCIUM SERPL-MCNC: 8 MG/DL — LOW (ref 8.4–10.5)
CALCIUM SERPL-MCNC: 8 MG/DL — LOW (ref 8.4–10.5)
CHLORIDE SERPL-SCNC: 102 MMOL/L — SIGNIFICANT CHANGE UP (ref 96–108)
CHLORIDE SERPL-SCNC: 105 MMOL/L — SIGNIFICANT CHANGE UP (ref 96–108)
CO2 SERPL-SCNC: 20 MMOL/L — LOW (ref 22–31)
CO2 SERPL-SCNC: 21 MMOL/L — LOW (ref 22–31)
CREAT SERPL-MCNC: 0.61 MG/DL — SIGNIFICANT CHANGE UP (ref 0.5–1.3)
CREAT SERPL-MCNC: 0.8 MG/DL — SIGNIFICANT CHANGE UP (ref 0.5–1.3)
EGFR: 104 ML/MIN/1.73M2 — SIGNIFICANT CHANGE UP
EGFR: 104 ML/MIN/1.73M2 — SIGNIFICANT CHANGE UP
EGFR: 126 ML/MIN/1.73M2 — SIGNIFICANT CHANGE UP
EGFR: 126 ML/MIN/1.73M2 — SIGNIFICANT CHANGE UP
EOSINOPHIL # BLD AUTO: 0 K/UL — SIGNIFICANT CHANGE UP (ref 0–0.5)
EOSINOPHIL # BLD AUTO: 0.01 K/UL — SIGNIFICANT CHANGE UP (ref 0–0.5)
EOSINOPHIL NFR BLD AUTO: 0 % — SIGNIFICANT CHANGE UP (ref 0–6)
EOSINOPHIL NFR BLD AUTO: 0.1 % — SIGNIFICANT CHANGE UP (ref 0–6)
FIBRINOGEN PPP-MCNC: 355 MG/DL — SIGNIFICANT CHANGE UP (ref 200–475)
GLUCOSE SERPL-MCNC: 130 MG/DL — HIGH (ref 70–99)
GLUCOSE SERPL-MCNC: 89 MG/DL — SIGNIFICANT CHANGE UP (ref 70–99)
HCT VFR BLD CALC: 39 % — SIGNIFICANT CHANGE UP (ref 34.5–45)
HCT VFR BLD CALC: 39.5 % — SIGNIFICANT CHANGE UP (ref 34.5–45)
HGB BLD-MCNC: 13.2 G/DL — SIGNIFICANT CHANGE UP (ref 11.5–15.5)
HGB BLD-MCNC: 13.5 G/DL — SIGNIFICANT CHANGE UP (ref 11.5–15.5)
IMM GRANULOCYTES NFR BLD AUTO: 0.5 % — SIGNIFICANT CHANGE UP (ref 0–0.9)
IMM GRANULOCYTES NFR BLD AUTO: 0.5 % — SIGNIFICANT CHANGE UP (ref 0–0.9)
INR BLD: 0.83 RATIO — LOW (ref 0.85–1.16)
LDH SERPL L TO P-CCNC: 253 U/L — HIGH (ref 120–225)
LDH SERPL L TO P-CCNC: 283 U/L — HIGH (ref 120–225)
LYMPHOCYTES # BLD AUTO: 0.79 K/UL — LOW (ref 1–3.3)
LYMPHOCYTES # BLD AUTO: 0.96 K/UL — LOW (ref 1–3.3)
LYMPHOCYTES # BLD AUTO: 4.2 % — LOW (ref 13–44)
LYMPHOCYTES # BLD AUTO: 7.9 % — LOW (ref 13–44)
MAGNESIUM SERPL-MCNC: 4.7 MG/DL — HIGH (ref 1.6–2.6)
MAGNESIUM SERPL-MCNC: 5.5 MG/DL — HIGH (ref 1.6–2.6)
MAGNESIUM SERPL-MCNC: 5.8 MG/DL — HIGH (ref 1.6–2.6)
MAGNESIUM SERPL-MCNC: 5.9 MG/DL — HIGH (ref 1.6–2.6)
MAGNESIUM SERPL-MCNC: 6 MG/DL — HIGH (ref 1.6–2.6)
MAGNESIUM SERPL-MCNC: 6 MG/DL — HIGH (ref 1.6–2.6)
MCHC RBC-ENTMCNC: 28.3 PG — SIGNIFICANT CHANGE UP (ref 27–34)
MCHC RBC-ENTMCNC: 28.5 PG — SIGNIFICANT CHANGE UP (ref 27–34)
MCHC RBC-ENTMCNC: 33.8 G/DL — SIGNIFICANT CHANGE UP (ref 32–36)
MCHC RBC-ENTMCNC: 34.2 G/DL — SIGNIFICANT CHANGE UP (ref 32–36)
MCV RBC AUTO: 83.5 FL — SIGNIFICANT CHANGE UP (ref 80–100)
MCV RBC AUTO: 83.7 FL — SIGNIFICANT CHANGE UP (ref 80–100)
MONOCYTES # BLD AUTO: 0.48 K/UL — SIGNIFICANT CHANGE UP (ref 0–0.9)
MONOCYTES # BLD AUTO: 0.62 K/UL — SIGNIFICANT CHANGE UP (ref 0–0.9)
MONOCYTES NFR BLD AUTO: 3.3 % — SIGNIFICANT CHANGE UP (ref 2–14)
MONOCYTES NFR BLD AUTO: 3.9 % — SIGNIFICANT CHANGE UP (ref 2–14)
NEUTROPHILS # BLD AUTO: 10.65 K/UL — HIGH (ref 1.8–7.4)
NEUTROPHILS # BLD AUTO: 17.07 K/UL — HIGH (ref 1.8–7.4)
NEUTROPHILS NFR BLD AUTO: 87.4 % — HIGH (ref 43–77)
NEUTROPHILS NFR BLD AUTO: 91.7 % — HIGH (ref 43–77)
NRBC BLD AUTO-RTO: 0 /100 WBCS — SIGNIFICANT CHANGE UP (ref 0–0)
NRBC BLD AUTO-RTO: 0 /100 WBCS — SIGNIFICANT CHANGE UP (ref 0–0)
PLATELET # BLD AUTO: 148 K/UL — LOW (ref 150–400)
PLATELET # BLD AUTO: 159 K/UL — SIGNIFICANT CHANGE UP (ref 150–400)
POTASSIUM SERPL-MCNC: 3.6 MMOL/L — SIGNIFICANT CHANGE UP (ref 3.5–5.3)
POTASSIUM SERPL-MCNC: 3.8 MMOL/L — SIGNIFICANT CHANGE UP (ref 3.5–5.3)
POTASSIUM SERPL-SCNC: 3.6 MMOL/L — SIGNIFICANT CHANGE UP (ref 3.5–5.3)
POTASSIUM SERPL-SCNC: 3.8 MMOL/L — SIGNIFICANT CHANGE UP (ref 3.5–5.3)
PROT SERPL-MCNC: 6 G/DL — SIGNIFICANT CHANGE UP (ref 6–8.3)
PROT SERPL-MCNC: 6.1 G/DL — SIGNIFICANT CHANGE UP (ref 6–8.3)
PROTHROM AB SERPL-ACNC: 9.7 SEC — LOW (ref 9.9–13.4)
RBC # BLD: 4.66 M/UL — SIGNIFICANT CHANGE UP (ref 3.8–5.2)
RBC # BLD: 4.73 M/UL — SIGNIFICANT CHANGE UP (ref 3.8–5.2)
RBC # FLD: 14.9 % — HIGH (ref 10.3–14.5)
RBC # FLD: 15.1 % — HIGH (ref 10.3–14.5)
SODIUM SERPL-SCNC: 132 MMOL/L — LOW (ref 135–145)
SODIUM SERPL-SCNC: 134 MMOL/L — LOW (ref 135–145)
T PALLIDUM AB TITR SER: NEGATIVE — SIGNIFICANT CHANGE UP
URATE SERPL-MCNC: 5.2 MG/DL — SIGNIFICANT CHANGE UP (ref 2.5–7)
URATE SERPL-MCNC: 5.6 MG/DL — SIGNIFICANT CHANGE UP (ref 2.5–7)
WBC # BLD: 12.18 K/UL — HIGH (ref 3.8–10.5)
WBC # BLD: 18.62 K/UL — HIGH (ref 3.8–10.5)
WBC # FLD AUTO: 12.18 K/UL — HIGH (ref 3.8–10.5)
WBC # FLD AUTO: 18.62 K/UL — HIGH (ref 3.8–10.5)

## 2025-04-01 PROCEDURE — 59409 OBSTETRICAL CARE: CPT | Mod: U9

## 2025-04-01 RX ORDER — PRENATAL 136/IRON/FOLIC ACID 27 MG-1 MG
1 TABLET ORAL DAILY
Refills: 0 | Status: DISCONTINUED | OUTPATIENT
Start: 2025-04-01 | End: 2025-04-02

## 2025-04-01 RX ORDER — SENNA 187 MG
2 TABLET ORAL DAILY
Refills: 0 | Status: DISCONTINUED | OUTPATIENT
Start: 2025-04-01 | End: 2025-04-02

## 2025-04-01 RX ORDER — DIBUCAINE 10 MG/G
1 OINTMENT TOPICAL EVERY 6 HOURS
Refills: 0 | Status: DISCONTINUED | OUTPATIENT
Start: 2025-04-01 | End: 2025-04-03

## 2025-04-01 RX ORDER — ACETAMINOPHEN 500 MG/5ML
1000 LIQUID (ML) ORAL ONCE
Refills: 0 | Status: COMPLETED | OUTPATIENT
Start: 2025-04-01 | End: 2025-04-01

## 2025-04-01 RX ORDER — SIMETHICONE 80 MG
80 TABLET,CHEWABLE ORAL EVERY 4 HOURS
Refills: 0 | Status: DISCONTINUED | OUTPATIENT
Start: 2025-04-01 | End: 2025-04-03

## 2025-04-01 RX ORDER — LABETALOL HYDROCHLORIDE 200 MG/1
200 TABLET, FILM COATED ORAL ONCE
Refills: 0 | Status: COMPLETED | OUTPATIENT
Start: 2025-04-01 | End: 2025-04-01

## 2025-04-01 RX ORDER — HYDROCORTISONE 10 MG/G
1 CREAM TOPICAL EVERY 6 HOURS
Refills: 0 | Status: DISCONTINUED | OUTPATIENT
Start: 2025-04-01 | End: 2025-04-03

## 2025-04-01 RX ORDER — MODIFIED LANOLIN 100 %
1 CREAM (GRAM) TOPICAL EVERY 6 HOURS
Refills: 0 | Status: DISCONTINUED | OUTPATIENT
Start: 2025-04-01 | End: 2025-04-03

## 2025-04-01 RX ORDER — ACETAMINOPHEN 500 MG/5ML
975 LIQUID (ML) ORAL EVERY 6 HOURS
Refills: 0 | Status: COMPLETED | OUTPATIENT
Start: 2025-04-01 | End: 2026-02-28

## 2025-04-01 RX ORDER — DIPHENHYDRAMINE HCL 12.5MG/5ML
25 ELIXIR ORAL ONCE
Refills: 0 | Status: COMPLETED | OUTPATIENT
Start: 2025-04-01 | End: 2025-04-01

## 2025-04-01 RX ORDER — MAGNESIUM HYDROXIDE 400 MG/5ML
30 SUSPENSION ORAL
Refills: 0 | Status: DISCONTINUED | OUTPATIENT
Start: 2025-04-01 | End: 2025-04-03

## 2025-04-01 RX ORDER — PRAMOXINE HCL 1 %
1 GEL (GRAM) TOPICAL EVERY 4 HOURS
Refills: 0 | Status: DISCONTINUED | OUTPATIENT
Start: 2025-04-01 | End: 2025-04-03

## 2025-04-01 RX ORDER — WITCH HAZEL LEAF
1 FLUID EXTRACT MISCELLANEOUS EVERY 4 HOURS
Refills: 0 | Status: DISCONTINUED | OUTPATIENT
Start: 2025-04-01 | End: 2025-04-03

## 2025-04-01 RX ORDER — DIPHENHYDRAMINE HCL 12.5MG/5ML
25 ELIXIR ORAL EVERY 6 HOURS
Refills: 0 | Status: DISCONTINUED | OUTPATIENT
Start: 2025-04-01 | End: 2025-04-03

## 2025-04-01 RX ORDER — CLOSTRIDIUM TETANI TOXOID ANTIGEN (FORMALDEHYDE INACTIVATED), CORYNEBACTERIUM DIPHTHERIAE TOXOID ANTIGEN (FORMALDEHYDE INACTIVATED), BORDETELLA PERTUSSIS TOXOID ANTIGEN (GLUTARALDEHYDE INACTIVATED), BORDETELLA PERTUSSIS FILAMENTOUS HEMAGGLUTININ ANTIGEN (FORMALDEHYDE INACTIVATED), BORDETELLA PERTUSSIS PERTACTIN ANTIGEN, AND BORDETELLA PERTUSSIS FIMBRIAE 2/3 ANTIGEN 5; 2; 2.5; 5; 3; 5 [LF]/.5ML; [LF]/.5ML; UG/.5ML; UG/.5ML; UG/.5ML; UG/.5ML
0.5 INJECTION, SUSPENSION INTRAMUSCULAR ONCE
Refills: 0 | Status: DISCONTINUED | OUTPATIENT
Start: 2025-04-01 | End: 2025-04-03

## 2025-04-01 RX ORDER — LABETALOL HYDROCHLORIDE 200 MG/1
200 TABLET, FILM COATED ORAL EVERY 8 HOURS
Refills: 0 | Status: DISCONTINUED | OUTPATIENT
Start: 2025-04-01 | End: 2025-04-02

## 2025-04-01 RX ORDER — FERROUS SULFATE 137(45) MG
325 TABLET, EXTENDED RELEASE ORAL DAILY
Refills: 0 | Status: DISCONTINUED | OUTPATIENT
Start: 2025-04-01 | End: 2025-04-02

## 2025-04-01 RX ORDER — ENOXAPARIN SODIUM 100 MG/ML
40 INJECTION SUBCUTANEOUS EVERY 24 HOURS
Refills: 0 | Status: DISCONTINUED | OUTPATIENT
Start: 2025-04-02 | End: 2025-04-03

## 2025-04-01 RX ORDER — OXYTOCIN-SODIUM CHLORIDE 0.9% IV SOLN 30 UNIT/500ML 30-0.9/5 UT/ML-%
167 SOLUTION INTRAVENOUS
Qty: 30 | Refills: 0 | Status: DISCONTINUED | OUTPATIENT
Start: 2025-04-01 | End: 2025-04-03

## 2025-04-01 RX ORDER — FOLIC ACID 1 MG/1
1 TABLET ORAL DAILY
Refills: 0 | Status: DISCONTINUED | OUTPATIENT
Start: 2025-04-01 | End: 2025-04-02

## 2025-04-01 RX ORDER — BENZOCAINE 220 MG/G
1 SPRAY, METERED PERIODONTAL EVERY 6 HOURS
Refills: 0 | Status: DISCONTINUED | OUTPATIENT
Start: 2025-04-01 | End: 2025-04-03

## 2025-04-01 RX ADMIN — SODIUM CHLORIDE 75 MILLILITER(S): 9 INJECTION, SOLUTION INTRAVENOUS at 04:59

## 2025-04-01 RX ADMIN — SODIUM CHLORIDE 75 MILLILITER(S): 9 INJECTION, SOLUTION INTRAVENOUS at 19:35

## 2025-04-01 RX ADMIN — Medication 3 MILLILITER(S): at 15:59

## 2025-04-01 RX ADMIN — OXYTOCIN-SODIUM CHLORIDE 0.9% IV SOLN 30 UNIT/500ML 167 MILLIUNIT(S)/MIN: 30-0.9/5 SOLUTION at 16:10

## 2025-04-01 RX ADMIN — Medication 25 MILLIGRAM(S): at 07:02

## 2025-04-01 RX ADMIN — Medication 400 MILLIGRAM(S): at 16:07

## 2025-04-01 RX ADMIN — LABETALOL HYDROCHLORIDE 200 MILLIGRAM(S): 200 TABLET, FILM COATED ORAL at 18:02

## 2025-04-01 NOTE — OB RN DELIVERY SUMMARY - NS_BREASTINHOURA_OBGYN_ALL_OB
Patient was brought in by EMS and is agitated and aggressive. Patient is believed to be impaired by the officers pre-hospitally. Patient is very flush and eyes are glassy and reddened. Patient does threaten staff and EMS and police are called to room to help manage patient.       Marcelle Ross RN  06/15/18 [id number] Unable to offer, due to 's clinical indication

## 2025-04-01 NOTE — OB NEONATOLOGY/PEDIATRICIAN DELIVERY SUMMARY - NSPEDSNEONOTESA_OBGYN_ALL_OB_FT
Requested by OB to attend this vaginal delivery at 40 weeks.  Mother is a 26 year old, , blood type O+.  Prenatal labs as follow: HIV neg, RPR non-reactive, rubella nonimmune, HBsA neg, GBS neg on 3/7.  Maternal history significant for rhinoplasty in . No significant prenatal history. This pregnancy was   complicated by preeclampsia without severe features, for which mother was an IOL, and on MagSulfate.  AROM  at 05:35  with  mec and bloody fluid  ~7 hours prior to delivery. Nuchal cord x1.  Infant emerged vertex, floppy, with poor tone. Delayed cord clamping X 30 secs, then brought to warmer. Dried, suctioned and stimulated. Given PPV via T-piece, and pulse oximetry attached. NeoCode 100 called. Infant always with HR >100. Given PPV via T-piece, FIO2 increased to 100. At ~4 mins of life infant began to cry weakly, and PPV discontinued and CPAP given via T-piece. FIO2 weaned from 100 to 21, as oxygen saturations were well WNL limits for minute of life.  Apgars 4, 8. Mom wishes to breast feed. Consents to  Hep B vaccine.  Declines circumcision. Infant admitted to  /NICU for further management of care due to respiratory failure. Parents updated, and shown infant.

## 2025-04-01 NOTE — OB RN DELIVERY SUMMARY - NS_SEPSISRSKCALC_OBGYN_ALL_OB_FT
GBS status in the 'Prenatal Lab tests/results section' on the OB RN Patient Profile must be documented.   EOS calculated successfully. EOS Risk Factor: 0.5/1000 live births ([facility] national incidence); GA=40w;Temp=99.1; ROM=7.333; GBS='Negative'; Antibiotics='No antibiotics or any antibiotics < 2 hrs prior to birth'

## 2025-04-01 NOTE — OB PROVIDER LABOR PROGRESS NOTE - NS_OBIHICONTRACTIONPATTERNDETAILS_OBGYN_ALL_OB_FT
3-4 min
irregular CTX
q 4-5 min
Regular q2-3 minutes, 50-70 seconds in duration
irregular
now via iu will monitor MVU
irregular
q 2-3 min
via iupc

## 2025-04-01 NOTE — OB PROVIDER LABOR PROGRESS NOTE - NSVAGINALEXAM_OBGYN_ALL_OB_DT
01-Apr-2025 05:48
01-Apr-2025 09:55
01-Apr-2025 11:38
01-Apr-2025 01:58
01-Apr-2025 07:55
31-Mar-2025 19:45

## 2025-04-01 NOTE — OB PROVIDER LABOR PROGRESS NOTE - NS_OBIHIFHRDETAILS_OBGYN_ALL_OB_FT
140 minimal variability w early decel    dr simmons - in unit on call attending :reviewed the fetal tracing - as per dr simmons it is a mag tracing
140 moderate accels no decels
Baseline 145, moderate variability, + accels, no decels
Baseline 140, moderate variability, no accels , 2 variable decels
baseline 145, moderate variability, + accels,  no decels
150 baseline, minimal variability, no accels, recurrent early decels
baseline 150, moderate variability, + accels,  no decels
145 min-mod variability +early decel
baseline 130, moderate variability, + accels,  no decels

## 2025-04-01 NOTE — OB PROVIDER LABOR PROGRESS NOTE - NS_SUBJECTIVE/OBJECTIVE_OBGYN_ALL_OB_FT
-Pt comfortable w/ epidural  -Reports mild pelvic pressure  -No acute complaints     -Pt on Mag sulfate  -Pt is on Pit augmentation   -IUPC in place
Pt notes to have her second severe range BP in the last 2 hours    161/86 @ 2152    Denies HA, vision changes, Chest pain, SOB, epigastric pain, edema, N/V/D, or RUQ pain.    No LE edema, DTR +2
Tracing note
pt comfortable. Denies HA, visual changes. No chest pain/SOB. No N/V. No RUQ/epigastric pain.    Mild range BP
pt comfortable sleepy; +epidural in place    mag sulfate at 2g/hr last mg level 4.7 due for next level check    Pitocin at 10mu    iupc placed- pt tolerated the placement
pt comfortable w epidural    denies any pelvic pressure at this time    mag sulfate at 2g/hr    pitocin    +iupc                          13.5   12.18 )-----------( 148      ( 01 Apr 2025 08:39 )             39.5     04-01    134[L]  |  105  |  11  ----------------------------<  89  3.6   |  21[L]  |  0.61    Ca    8.0[L]      01 Apr 2025 08:39  Mg     6.0     04-01    TPro  6.1  /  Alb  2.4[L]  /  TBili  0.4  /  DBili  x   /  AST  25  /  ALT  21  /  AlkPhos  292[H]  04-01
Pt doing well, no complaints at this time    Denies HA, vision changes, Chest pain, SOB, epigastric pain, edema, N/V/D, or RUQ pain.    Bean clear, urine output 75cc/hr  No edema, reflexes 2+    AROM discussed with patient, Pt agreeable    Mag level 4.7 @ 240
Pt resting comfortably in bed, no complaints at this time
Pt doing well, no complaints at this time    Denies HA, vision changes, Chest pain, SOB, epigastric pain, edema, N/V/D, or RUQ pain.    Bean clear, urine output 100cc/hr  No edema, reflexes 2+

## 2025-04-01 NOTE — OB RN DELIVERY SUMMARY - NS_CORDBLDTYPEA_OBGYN_ALL_OB
Yes
PAST SURGICAL HISTORY:  History of incision and drainage     No significant past surgical history

## 2025-04-01 NOTE — OB PROVIDER LABOR PROGRESS NOTE - ASSESSMENT
- dr zuñiga notified    - as per RN 50ml/hr output 
25 yo @ 39w6d admitted for MIOL for PEC w/SF    - FHT Reviewed  - Continuous maternal and fetal monitoring   - Pain management per patient request, pt declines at this time  - IV Pitocin per protcol  - Ob mag sulfate per protocol, Strict I's and O's, strict BP monitoring   - Pt inform to notify team of any new symptoms    D/W Dr. Tiwari   
25 yo @ 39w6d admitted for MIOL for PEC w/o SF    - FHT Reviewed  - Continuous maternal and fetal monitoring   - Pain management per patient request, pt declines at this time  - discontinue PO cytotec   - Start IV Pitocin per protcol    D/W Dr. Tiwari   
23 yo  at 39.6 wks GA mIOL for PEC w/o SF s/p PO cytotec 20#1, cat 1 FHT, GBS neg  - cont current monitoring  - for PO cytotec #2 now  - monitor BP  - monitor for sx's or worsening PEC   Attg: Dr. Tiwari
23 yo @ 40w0d admitted for MIOL for PEC w/SF    - FHT Reviewed  - Continuous maternal and fetal monitoring   - Pain management per patient request, Epidural in place   - IV Pitocin per protcol  - Ob mag sulfate per protocol, Strict I's and O's, strict BP monitoring     D/W Dr. Tiwari   
25yo  at 40weeks was MIOL for PEC w/o SF - now PEC w SF severe BP ranges    - on Mag Sulfate at 2g/hr    HELLP labs/Mg level    - strict i/o: q 1hr    - iv fluids total 125ml/hr    - npo    - PEC monitor per protocol    
24yr old F P0 now fully dilated.   Attending Karie aware and on the way.   Pre eclampsia w/ SF on Mag   
25 yo @ 40w0d admitted for MIOL for PEC w/SF    - FHT Reviewed  - Continuous maternal and fetal monitoring   - Pain management per patient request, Epidural in place   - IV Pitocin per protcol  - AROM blood tinged, pt tolerated well  - Ob mag sulfate per protocol, Strict I's and O's, strict BP monitoring     D/W Dr. Tiwari

## 2025-04-02 LAB
ALBUMIN SERPL ELPH-MCNC: 1.8 G/DL — LOW (ref 3.5–5)
ALP SERPL-CCNC: 188 U/L — HIGH (ref 40–120)
ALT FLD-CCNC: 16 U/L DA — SIGNIFICANT CHANGE UP (ref 10–60)
ANION GAP SERPL CALC-SCNC: 8 MMOL/L — SIGNIFICANT CHANGE UP (ref 5–17)
APTT BLD: 35.3 SEC — SIGNIFICANT CHANGE UP (ref 24.5–35.6)
AST SERPL-CCNC: 21 U/L — SIGNIFICANT CHANGE UP (ref 10–40)
BASOPHILS # BLD AUTO: 0.03 K/UL — SIGNIFICANT CHANGE UP (ref 0–0.2)
BASOPHILS NFR BLD AUTO: 0.2 % — SIGNIFICANT CHANGE UP (ref 0–2)
BILIRUB SERPL-MCNC: 0.3 MG/DL — SIGNIFICANT CHANGE UP (ref 0.2–1.2)
BUN SERPL-MCNC: 9 MG/DL — SIGNIFICANT CHANGE UP (ref 7–18)
CALCIUM SERPL-MCNC: 7.3 MG/DL — LOW (ref 8.4–10.5)
CHLORIDE SERPL-SCNC: 104 MMOL/L — SIGNIFICANT CHANGE UP (ref 96–108)
CO2 SERPL-SCNC: 22 MMOL/L — SIGNIFICANT CHANGE UP (ref 22–31)
CREAT SERPL-MCNC: 0.57 MG/DL — SIGNIFICANT CHANGE UP (ref 0.5–1.3)
EGFR: 128 ML/MIN/1.73M2 — SIGNIFICANT CHANGE UP
EGFR: 128 ML/MIN/1.73M2 — SIGNIFICANT CHANGE UP
EOSINOPHIL # BLD AUTO: 0.05 K/UL — SIGNIFICANT CHANGE UP (ref 0–0.5)
EOSINOPHIL NFR BLD AUTO: 0.4 % — SIGNIFICANT CHANGE UP (ref 0–6)
FIBRINOGEN PPP-MCNC: 449 MG/DL — SIGNIFICANT CHANGE UP (ref 200–475)
GLUCOSE SERPL-MCNC: 111 MG/DL — HIGH (ref 70–99)
HCT VFR BLD CALC: 28.3 % — LOW (ref 34.5–45)
HGB BLD-MCNC: 9.6 G/DL — LOW (ref 11.5–15.5)
IMM GRANULOCYTES NFR BLD AUTO: 0.7 % — SIGNIFICANT CHANGE UP (ref 0–0.9)
INR BLD: 0.92 RATIO — SIGNIFICANT CHANGE UP (ref 0.85–1.16)
LDH SERPL L TO P-CCNC: 194 U/L — SIGNIFICANT CHANGE UP (ref 120–225)
LYMPHOCYTES # BLD AUTO: 16.7 % — SIGNIFICANT CHANGE UP (ref 13–44)
LYMPHOCYTES # BLD AUTO: 2.01 K/UL — SIGNIFICANT CHANGE UP (ref 1–3.3)
MAGNESIUM SERPL-MCNC: 5.3 MG/DL — HIGH (ref 1.6–2.6)
MAGNESIUM SERPL-MCNC: 5.9 MG/DL — HIGH (ref 1.6–2.6)
MAGNESIUM SERPL-MCNC: 5.9 MG/DL — HIGH (ref 1.6–2.6)
MCHC RBC-ENTMCNC: 28.7 PG — SIGNIFICANT CHANGE UP (ref 27–34)
MCHC RBC-ENTMCNC: 33.9 G/DL — SIGNIFICANT CHANGE UP (ref 32–36)
MCV RBC AUTO: 84.7 FL — SIGNIFICANT CHANGE UP (ref 80–100)
MONOCYTES # BLD AUTO: 0.39 K/UL — SIGNIFICANT CHANGE UP (ref 0–0.9)
MONOCYTES NFR BLD AUTO: 3.2 % — SIGNIFICANT CHANGE UP (ref 2–14)
NEUTROPHILS # BLD AUTO: 9.51 K/UL — HIGH (ref 1.8–7.4)
NEUTROPHILS NFR BLD AUTO: 78.8 % — HIGH (ref 43–77)
NRBC BLD AUTO-RTO: 0 /100 WBCS — SIGNIFICANT CHANGE UP (ref 0–0)
PLATELET # BLD AUTO: 133 K/UL — LOW (ref 150–400)
POTASSIUM SERPL-MCNC: 3.8 MMOL/L — SIGNIFICANT CHANGE UP (ref 3.5–5.3)
POTASSIUM SERPL-SCNC: 3.8 MMOL/L — SIGNIFICANT CHANGE UP (ref 3.5–5.3)
PROT SERPL-MCNC: 4.7 G/DL — LOW (ref 6–8.3)
PROTHROM AB SERPL-ACNC: 10.8 SEC — SIGNIFICANT CHANGE UP (ref 9.9–13.4)
RBC # BLD: 3.34 M/UL — LOW (ref 3.8–5.2)
RBC # FLD: 15.9 % — HIGH (ref 10.3–14.5)
SODIUM SERPL-SCNC: 134 MMOL/L — LOW (ref 135–145)
URATE SERPL-MCNC: 5.7 MG/DL — SIGNIFICANT CHANGE UP (ref 2.5–7)
WBC # BLD: 12.07 K/UL — HIGH (ref 3.8–10.5)
WBC # FLD AUTO: 12.07 K/UL — HIGH (ref 3.8–10.5)

## 2025-04-02 RX ORDER — MEASLES,MUMPS,RUBELLA LIVE VAC 20000/0.5
0.5 VIAL (EA) SUBCUTANEOUS ONCE
Refills: 0 | Status: COMPLETED | OUTPATIENT
Start: 2025-04-02 | End: 2025-04-03

## 2025-04-02 RX ORDER — LABETALOL HYDROCHLORIDE 200 MG/1
200 TABLET, FILM COATED ORAL EVERY 12 HOURS
Refills: 0 | Status: DISCONTINUED | OUTPATIENT
Start: 2025-04-02 | End: 2025-04-03

## 2025-04-02 RX ORDER — ACETAMINOPHEN 500 MG/5ML
975 LIQUID (ML) ORAL EVERY 6 HOURS
Refills: 0 | Status: DISCONTINUED | OUTPATIENT
Start: 2025-04-02 | End: 2025-04-03

## 2025-04-02 RX ADMIN — Medication 3 MILLILITER(S): at 21:48

## 2025-04-02 RX ADMIN — LABETALOL HYDROCHLORIDE 200 MILLIGRAM(S): 200 TABLET, FILM COATED ORAL at 17:48

## 2025-04-02 RX ADMIN — Medication 3 MILLILITER(S): at 22:41

## 2025-04-02 RX ADMIN — Medication 1 TABLET(S): at 11:02

## 2025-04-02 RX ADMIN — LABETALOL HYDROCHLORIDE 200 MILLIGRAM(S): 200 TABLET, FILM COATED ORAL at 01:56

## 2025-04-02 RX ADMIN — Medication 3 MILLILITER(S): at 13:10

## 2025-04-02 RX ADMIN — FOLIC ACID 1 MILLIGRAM(S): 1 TABLET ORAL at 11:02

## 2025-04-02 RX ADMIN — Medication 325 MILLIGRAM(S): at 11:03

## 2025-04-02 RX ADMIN — Medication 2 TABLET(S): at 11:03

## 2025-04-02 RX ADMIN — ENOXAPARIN SODIUM 40 MILLIGRAM(S): 100 INJECTION SUBCUTANEOUS at 04:10

## 2025-04-02 NOTE — CHART NOTE - NSCHARTNOTEFT_GEN_A_CORE
PA NOTE  Labs reviewed    Vital Signs Last 24 Hrs  T(C): 36.7 (02 Apr 2025 08:19), Max: 37.3 (01 Apr 2025 13:13)  T(F): 98.1 (02 Apr 2025 08:19), Max: 99.1 (01 Apr 2025 13:13)  HR: 81 (02 Apr 2025 09:19) (78 - 104)  BP: 111/63 (02 Apr 2025 09:19) (107/59 - 154/95)  BP(mean): 81 (02 Apr 2025 09:19) (80 - 118)  RR: 16 (02 Apr 2025 09:19) (14 - 18)  SpO2: 97% (02 Apr 2025 09:19) (95% - 100%)    Parameters below as of 02 Apr 2025 09:19  Patient On (Oxygen Delivery Method): room air       urinary output approx 100-300ml/hourly        CBC Full  -  ( 02 Apr 2025 09:30 )  WBC Count : 12.07 K/uL  RBC Count : 3.34 M/uL  Hemoglobin : 9.6 g/dL  Hematocrit : 28.3 %  Platelet Count - Automated : 133 K/uL  Mean Cell Volume : 84.7 fl  Mean Cell Hemoglobin : 28.7 pg  Mean Cell Hemoglobin Concentration : 33.9 g/dL  Auto Neutrophil # : x  Auto Lymphocyte # : x  Auto Monocyte # : x  Auto Eosinophil # : x  Auto Basophil # : x  Auto Neutrophil % : x  Auto Lymphocyte % : x  Auto Monocyte % : x  Auto Eosinophil % : x  Auto Basophil % : x    04-02    134[L]  |  104  |  9   ----------------------------<  111[H]  3.8   |  22  |  0.57    Ca    7.3[L]      02 Apr 2025 09:30, corrected calcium 9.1mg/dL  Mg     5.9     04-02    TPro  4.7[L]  /  Alb  1.8[L]  /  TBili  0.3  /  DBili  x   /  AST  21  /  ALT  16  /  AlkPhos  188[H]  04-02    LIVER FUNCTIONS - ( 02 Apr 2025 09:30 )  Alb: 1.8 g/dL / Pro: 4.7 g/dL / ALK PHOS: 188 U/L / ALT: 16 U/L DA / AST: 21 U/L / GGT: x           Urinalysis Basic - ( 02 Apr 2025 09:30 )    Color: x / Appearance: x / SG: x / pH: x  Gluc: 111 mg/dL / Ketone: x  / Bili: x / Urobili: x   Blood: x / Protein: x / Nitrite: x   Leuk Esterase: x / RBC: x / WBC x   Sq Epi: x / Non Sq Epi: x / Bacteria: x       PT/INR - ( 02 Apr 2025 09:30 )   PT: 10.8 sec;   INR: 0.92 ratio         PTT - ( 02 Apr 2025 09:30 )  PTT:35.3 sec    continue current care
PA NOTE:    Patient seen at bedside resting comfortably offers no new complaints, feels slightly drowsy. Denies HA, CP, SOB, N/V/D, dizziness, palpitations, worsening abdominal pain, worsening vaginal bleeding, or any other concerns. voiding into thompson clr;   in nursery    Vital Signs Last 24 Hrs  T(C): 36.4 (2025 06:28), Max: 37.3 (2025 13:13)  T(F): 97.5 (2025 06:28), Max: 99.1 (2025 13:13)  HR: 78 (2025 07:19) (78 - 104)  BP: 126/71 (2025 07:19) (107/59 - 154/95)  BP(mean): 93 (2025 07:19) (80 - 118)  RR: 17 (2025 07:19) (14 - 18)  SpO2: 96% (2025 07:19) (95% - 100%)    Parameters below as of 2025 07:19  Patient On (Oxygen Delivery Method): room air       urinary output approx 100ml/hourly      Gen: A&O x 3, NAD  Chest: CTA B/L  Cardiac: S1,S2 RRR  Breast: Soft, nontender, nonengorged  Abdomen: +BS; soft; Nontender, nondistended  Gyn: Minimal lochia  Extremities: Nontender, DTRS 2+ b/l; edema 2+b/l; negative clonus; venodynes in place                          13.2   18.62 )-----------( 159      ( 2025 15:09 )             39.0       04-01    132[L]  |  102  |  12  ----------------------------<  130[H]  3.8   |  20[L]  |  0.80    Ca    8.0[L]      2025 15:09  Mg     5.3     04-02    TPro  6.0  /  Alb  2.4[L]  /  TBili  0.5  /  DBili  x   /  AST  28  /  ALT  18  /  AlkPhos  284[H]  04-01    LIVER FUNCTIONS - ( 2025 15:09 )  Alb: 2.4 g/dL / Pro: 6.0 g/dL / ALK PHOS: 284 U/L / ALT: 18 U/L DA / AST: 28 U/L / GGT: x           Urinalysis Basic - ( 2025 15:09 )    Color: x / Appearance: x / SG: x / pH: x  Gluc: 130 mg/dL / Ketone: x  / Bili: x / Urobili: x   Blood: x / Protein: x / Nitrite: x   Leuk Esterase: x / RBC: x / WBC x   Sq Epi: x / Non Sq Epi: x / Bacteria: x       PT/INR - ( 2025 08:39 )   PT: 9.7 sec;   INR: 0.83 ratio         PTT - ( 2025 08:39 )  PTT:30.3 sec    A/P: 24y F  PPD # 1 s/p  @ 40weeks, PEC w/o severe fx  -Pain management as needed  -cont mag sulfate x 24 hours pp  -f/u Rpt hellp labs and mag level  -venodynes/lovenox for VTE ppx  -labetalol 200mg q 8 hours  -De Tiwari aware
Patient seen at bedside resting comfortably offers no new complaints, feels slightly drowsy. Pt voiding into thompson clear urine. Pt both breast and bottle feeding. Pt denies headache,  blurry vision, epigastric pain, chest pain, shortness of breath, N/V/D, dizziness, palpitations, worsening abdominal pain, worsening vaginal bleeding or any other complaints.    Vital Signs Last 24 Hrs  T(C): 36.4 (2025 21:00), Max: 37.3 (2025 13:13)  T(F): 97.5 (2025 21:00), Max: 99.1 (2025 13:13)  HR: 79 (2025 23:19) (79 - 104)  BP: 134/83 (2025 23:19) (107/59 - 154/95)  BP(mean): 80 (2025 19:53) (80 - 118)  RR: 16 (2025 23:19) (14 - 18)  SpO2: 97% (2025 23:19) (95% - 100%)    Parameters below as of 2025 23:19  Patient On (Oxygen Delivery Method): room air         OUTPUT  - urinary ouptut: 200 cc/hr     Gen: A&O x 3, NAD  Chest: CTA B/L  Cardiac: S1,S2 RRR  Breast: Soft, nontender, nonengorged  Abdomen: +BS; soft; Nontender, nondistended  Gyn: Minimal lochia  Extremities: Nontender, DTRS 2+ b/l; no edema  venodynes in place                          13.2   18.62 )-----------( 159      ( 2025 15:09 )             39.0     04-01    132[L]  |  102  |  12  ----------------------------<  130[H]  3.8   |  20[L]  |  0.80    Ca    8.0[L]      2025 15:09  Mg     5.3     04-02    TPro  6.0  /  Alb  2.4[L]  /  TBili  0.5  /  DBili  x   /  AST  28  /  ALT  18  /  AlkPhos  284[H]  04-    LIVER FUNCTIONS - ( 2025 15:09 )  Alb: 2.4 g/dL / Pro: 6.0 g/dL / ALK PHOS: 284 U/L / ALT: 18 U/L DA / AST: 28 U/L / GGT: x           PT/INR - ( 2025 08:39 )   PT: 9.7 sec;   INR: 0.83 ratio         PTT - ( 2025 08:39 )  PTT:30.3 sec  Urinalysis Basic - ( 2025 15:09 )    Color: x / Appearance: x / SG: x / pH: x  Gluc: 130 mg/dL / Ketone: x  / Bili: x / Urobili: x   Blood: x / Protein: x / Nitrite: x   Leuk Esterase: x / RBC: x / WBC x   Sq Epi: x / Non Sq Epi: x / Bacteria: x        A/P: 23yo PPD # 1 s/p  @ 40w0d, PEC w/SF, pt stable     -Pain management as needed  -cont postpartum care   -cont mag sulfate, monitor urine output  -f/u Rpt hellp labs   -venodynes/lovenox for VTE ppx  -labetalol 200 q 8   - CM consulted     -d/w dr Tiwari
Patient seen at bedside resting comfortably offers no new complaints, feels slightly drowsy. Pt voiding into thompson clear urine. Pt denies headache, blurry vision, epigastric pain, chest pain, shortness of breath, N/V/D, dizziness, palpitations, worsening abdominal pain, worsening vaginal bleeding or any other complaints.    Vital Signs Last 24 Hrs  T(C): 37.2 (2025 18:53), Max: 37.3 (2025 13:13)  T(F): 99 (2025 18:53), Max: 99.1 (2025 13:13)  HR: 94 (2025 18:53) (86 - 104)  BP: 117/74 (2025 18:53) (117/74 - 154/95)  BP(mean): 89 (2025 18:53) (89 - 118)  RR: 14 (2025 18:53) (14 - 18)  SpO2: 98% (2025 18:53) (95% - 100%)    Parameters below as of 2025 15:43  Patient On (Oxygen Delivery Method): room air         OUTPUT  - urinary out put: 50 cc/hr     Gen: A&O x 3, NAD  Chest: CTA B/L  Cardiac: S1,S2 RRR  Breast: Soft, nontender, nonengorged  Abdomen: +BS; soft; Nontender, nondistended  Gyn: Minimal lochia  Extremities: Nontender, DTRS 2+ b/l; no edema; venodynes in place                          13.2   18.62 )-----------( 159      ( 2025 15:09 )             39.0     04-01    132[L]  |  102  |  12  ----------------------------<  130[H]  3.8   |  20[L]  |  0.80    Ca    8.0[L]      2025 15:09  Mg     6.0     04-    TPro  6.0  /  Alb  2.4[L]  /  TBili  0.5  /  DBili  x   /  AST  28  /  ALT  18  /  AlkPhos  284[H]  04-    LIVER FUNCTIONS - ( 2025 15:09 )  Alb: 2.4 g/dL / Pro: 6.0 g/dL / ALK PHOS: 284 U/L / ALT: 18 U/L DA / AST: 28 U/L / GGT: x           PT/INR - ( 2025 08:39 )   PT: 9.7 sec;   INR: 0.83 ratio         PTT - ( 2025 08:39 )  PTT:30.3 sec  Urinalysis Basic - ( 2025 15:09 )    Color: x / Appearance: x / SG: x / pH: x  Gluc: 130 mg/dL / Ketone: x  / Bili: x / Urobili: x   Blood: x / Protein: x / Nitrite: x   Leuk Esterase: x / RBC: x / WBC x   Sq Epi: x / Non Sq Epi: x / Bacteria: x        A/P: 25yo PPD # 0 s/p  @ 40w0d, PEC w/SF, pt stable     -Pain management as needed  -cont postpartum care   -cont mag sulfate, monitor urine output  -f/u Rpt hellp labs   -venodynes/lovenox for VTE ppx  -labetalol 200 q 8   - CM consulted     -d/w dr Tiwari

## 2025-04-03 ENCOUNTER — TRANSCRIPTION ENCOUNTER (OUTPATIENT)
Age: 25
End: 2025-04-03

## 2025-04-03 VITALS
RESPIRATION RATE: 17 BRPM | DIASTOLIC BLOOD PRESSURE: 79 MMHG | SYSTOLIC BLOOD PRESSURE: 117 MMHG | OXYGEN SATURATION: 98 % | TEMPERATURE: 98 F | HEART RATE: 69 BPM

## 2025-04-03 DIAGNOSIS — O14.10 SEVERE PRE-ECLAMPSIA, UNSPECIFIED TRIMESTER: ICD-10-CM

## 2025-04-03 DIAGNOSIS — D62 ACUTE POSTHEMORRHAGIC ANEMIA: ICD-10-CM

## 2025-04-03 PROCEDURE — 85025 COMPLETE CBC W/AUTO DIFF WBC: CPT

## 2025-04-03 PROCEDURE — 83615 LACTATE (LD) (LDH) ENZYME: CPT

## 2025-04-03 PROCEDURE — 86900 BLOOD TYPING SEROLOGIC ABO: CPT

## 2025-04-03 PROCEDURE — 90707 MMR VACCINE SC: CPT

## 2025-04-03 PROCEDURE — 85730 THROMBOPLASTIN TIME PARTIAL: CPT

## 2025-04-03 PROCEDURE — 86901 BLOOD TYPING SEROLOGIC RH(D): CPT

## 2025-04-03 PROCEDURE — 83735 ASSAY OF MAGNESIUM: CPT

## 2025-04-03 PROCEDURE — 36415 COLL VENOUS BLD VENIPUNCTURE: CPT

## 2025-04-03 PROCEDURE — 84156 ASSAY OF PROTEIN URINE: CPT

## 2025-04-03 PROCEDURE — 81001 URINALYSIS AUTO W/SCOPE: CPT

## 2025-04-03 PROCEDURE — 82570 ASSAY OF URINE CREATININE: CPT

## 2025-04-03 PROCEDURE — 85610 PROTHROMBIN TIME: CPT

## 2025-04-03 PROCEDURE — 85384 FIBRINOGEN ACTIVITY: CPT

## 2025-04-03 PROCEDURE — 59050 FETAL MONITOR W/REPORT: CPT

## 2025-04-03 PROCEDURE — 86850 RBC ANTIBODY SCREEN: CPT

## 2025-04-03 PROCEDURE — 86780 TREPONEMA PALLIDUM: CPT

## 2025-04-03 PROCEDURE — 86923 COMPATIBILITY TEST ELECTRIC: CPT

## 2025-04-03 PROCEDURE — 80053 COMPREHEN METABOLIC PANEL: CPT

## 2025-04-03 PROCEDURE — 84550 ASSAY OF BLOOD/URIC ACID: CPT

## 2025-04-03 RX ORDER — DOCUSATE SODIUM 100 MG
1 CAPSULE ORAL
Qty: 60 | Refills: 0
Start: 2025-04-03 | End: 2025-05-02

## 2025-04-03 RX ORDER — SIMETHICONE 80 MG
1 TABLET,CHEWABLE ORAL
Qty: 60 | Refills: 1
Start: 2025-04-03 | End: 2025-06-01

## 2025-04-03 RX ORDER — IBUPROFEN 200 MG
1 TABLET ORAL
Qty: 20 | Refills: 0
Start: 2025-04-03 | End: 2025-04-07

## 2025-04-03 RX ORDER — PRENATAL 136/IRON/FOLIC ACID 27 MG-1 MG
1 TABLET ORAL
Qty: 30 | Refills: 5
Start: 2025-04-03 | End: 2025-09-29

## 2025-04-03 RX ORDER — LABETALOL HYDROCHLORIDE 200 MG/1
1 TABLET, FILM COATED ORAL
Qty: 60 | Refills: 1
Start: 2025-04-03 | End: 2025-06-01

## 2025-04-03 RX ORDER — ACETAMINOPHEN 500 MG/5ML
2 LIQUID (ML) ORAL
Qty: 30 | Refills: 0
Start: 2025-04-03 | End: 2025-04-07

## 2025-04-03 RX ADMIN — Medication 975 MILLIGRAM(S): at 06:56

## 2025-04-03 RX ADMIN — LABETALOL HYDROCHLORIDE 200 MILLIGRAM(S): 200 TABLET, FILM COATED ORAL at 06:46

## 2025-04-03 RX ADMIN — Medication 3 MILLILITER(S): at 06:41

## 2025-04-03 RX ADMIN — ENOXAPARIN SODIUM 40 MILLIGRAM(S): 100 INJECTION SUBCUTANEOUS at 06:47

## 2025-04-03 RX ADMIN — Medication 0.5 MILLILITER(S): at 11:15

## 2025-04-03 NOTE — DISCHARGE NOTE OB - CARE PROVIDER_API CALL
Nina Tiwari (MD; MS)  Obstetrics and Gynecology  [address] [place] [address], [place] [address] [place] [address]  Phone: [phone]  Fax: [phone]  Follow Up Time: 1 week

## 2025-04-03 NOTE — DISCHARGE NOTE OB - PLAN OF CARE
take iron, folic acid, vitamin C, and prenatal vitamins. eat iron fortified food see obstetrician in 7days for blood pressure check in the office  make sure you check your blood pressure 4xday and record all the pressures for the doctor to see  return to the emergency room if the blood pressure is >150 and or >90  Follow-up with clinician in 1 week for blood pressure check, If systolic bp is >160 or diastolic >110 return to ER. Also return to ER if headache, epigastric pain, visual changes or increased swelling.     Please don't take your blood pressure medication if your systolic bp is <100 or diastolic <60. Pulse <60. The scheduled dose should be held. When you take your blood pressure again if it is above what is listed above you may resume your prescribed blood pressure medication. no sex nothing in vagina no heavy lifting no pushing no straining no strenuous activities  pain medication as needed; stool softener; dulcolax as needed if constipated  walk for exercise: helps recovery   continue prenatal vitamins daily especially whole course of breastfeeding  see your OB in the office for follow up post partum check 4-5weeks call the office to set up an appointment

## 2025-04-03 NOTE — DISCHARGE NOTE OB - HOSPITAL COURSE
pt admitted for miol PEC w/o SF  s/p  @40w  developed PEC w/ SF (BPs)  started on labetalol for BP management  s/p magnesium x24hrs  BPs now stable  pt stable for discharge home

## 2025-04-03 NOTE — DISCHARGE NOTE OB - MEDICATION SUMMARY - MEDICATIONS TO TAKE
I will START or STAY ON the medications listed below when I get home from the hospital:    blood pressure cuff  -- Take blood pressure 4 times daily  -- Indication: For preeclampsia    ibuprofen 600 mg oral tablet  -- 1 tab(s) by mouth every 6 hours as needed for  moderate pain  -- Indication: For pain    Tylenol Extra Strength 500 mg oral tablet  -- 2 tab(s) by mouth every 8 hours as needed for  mild pain  -- Indication: For pain    labetalol 200 mg oral tablet  -- 1 tab(s) by mouth 2 times a day take one tablet every 12 hours  -- Indication: For preeclampsia    Prenatal Plus oral tablet  -- 1 tab(s) by mouth once a day  -- Indication: For Supplement    Colace 100 mg oral capsule  -- 1 cap(s) by mouth 2 times a day as needed for  constipation  -- Indication: For constipation    simethicone 80 mg oral tablet, chewable  -- 1 tab(s) chewed 2 times a day as needed for gas take one tablet twice daily as needed for gas  -- Indication: For gas

## 2025-04-03 NOTE — DISCHARGE NOTE OB - PATIENT PORTAL LINK FT
You can access the FollowMyHealth Patient Portal offered by [facility] by registering at the following website: [email or web address]. By joining [facility]’s FollowMyHealth portal, you will also be able to view your health information using other applications (apps) compatible with our system.

## 2025-04-03 NOTE — DISCHARGE NOTE OB - FINANCIAL ASSISTANCE
[facility] provides services at a reduced cost to those who are determined to be eligible through [facility]’s financial assistance program. Information regarding [facility]’s financial assistance program can be found by going to [email or web address] or by calling [phone].

## 2025-04-03 NOTE — DISCHARGE NOTE OB - CARE PLAN
Principal Discharge DX:	Vaginal delivery  Assessment and plan of treatment:	no sex nothing in vagina no heavy lifting no pushing no straining no strenuous activities  pain medication as needed; stool softener; dulcolax as needed if constipated  walk for exercise: helps recovery   continue prenatal vitamins daily especially whole course of breastfeeding  see your OB in the office for follow up post partum check 4-5weeks call the office to set up an appointment  Secondary Diagnosis:	Severe preeclampsia  Assessment and plan of treatment:	see obstetrician in 7days for blood pressure check in the office  make sure you check your blood pressure 4xday and record all the pressures for the doctor to see  return to the emergency room if the blood pressure is >150 and or >90  Follow-up with clinician in 1 week for blood pressure check, If systolic bp is >160 or diastolic >110 return to ER. Also return to ER if headache, epigastric pain, visual changes or increased swelling.     Please don't take your blood pressure medication if your systolic bp is <100 or diastolic <60. Pulse <60. The scheduled dose should be held. When you take your blood pressure again if it is above what is listed above you may resume your prescribed blood pressure medication.  Secondary Diagnosis:	Anemia due to acute blood loss  Assessment and plan of treatment:	take iron, folic acid, vitamin C, and prenatal vitamins. eat iron fortified food   1

## 2025-04-03 NOTE — PROGRESS NOTE ADULT - SUBJECTIVE AND OBJECTIVE BOX
Patient seen at bedside, offers no current complaints  Resting comfortably with baby in the room  Ambulating and voiding without difficulty.  Passing flatus and tolerating regular diet.  Both breast/bottle feeding.  Denies HA, CP, SOB, N/V/D, dizziness, palpitations, worsening abdominal pain, worsening vaginal bleeding, or any other concerns.  No headache, blurry vision/vision changes, or abnormal leg swelling.    Vital Signs Last 24 Hrs  T(C): 36.8 (03 Apr 2025 04:30), Max: 36.9 (03 Apr 2025 00:45)  T(F): 98.3 (03 Apr 2025 04:30), Max: 98.5 (03 Apr 2025 00:45)  HR: 69 (03 Apr 2025 04:30) (69 - 88)  BP: 117/79 (03 Apr 2025 04:30) (110/63 - 122/68)  BP(mean): 93 (02 Apr 2025 12:19) (81 - 93)  RR: 17 (03 Apr 2025 04:30) (16 - 17)  SpO2: 98% (03 Apr 2025 04:30) (96% - 99%)    Parameters below as of 03 Apr 2025 04:30  Patient On (Oxygen Delivery Method): room air        Physical Exam:     Gen: A&Ox 3, NAD  Chest: CTA B/L  Cardiac: S1,S2; RRR  Breast: Soft, nontender, nonengorged  Abdomen: +BS; Soft, nontender, ND; Fundus firm below umbilicus  Gyn: Min lochia  Ext: Nontender, DTRS 2+, no worsening edema                          9.6    12.07 )-----------( 133      ( 02 Apr 2025 09:30 )             28.3

## 2025-04-03 NOTE — DISCHARGE NOTE OB - AVOID TUB BATHING UNTIL YOUR POSTPARTUM VISIT
Routing refill request to provider for review/approval because:  Last px was 12/2016, but last TSH in August 2016, T4 was WNL. Please fill as appropriate.  Kathya Calle RN           Statement Selected

## 2025-04-03 NOTE — DISCHARGE NOTE OB - MATERIALS PROVIDED
Vaccinations/  Immunization Record/Breastfeeding Mother’s Support Group Information/Guide to Postpartum Care/Back To Sleep Handout/Shaken Baby Prevention Handout/Birth Certificate Instructions/Discharge Medication Information for Patients and Families Pocket Guide/Letter of Medical Neccessity

## 2025-04-03 NOTE — PROGRESS NOTE ADULT - PROBLEM SELECTOR PLAN 1
- Discharge home with instructions  - Follow up in office in 1 wk for postpartum visit/ BP check  - Breastfeeding encouraged  - d/w dr. prather

## 2025-04-03 NOTE — PROGRESS NOTE ADULT - PROBLEM SELECTOR PLAN 2
- s/p mag x24 hours  - labetalol 200 Q12  - case management  - given BP cuff prescription  - instructions verbalized  - BPs stable  - d/w dr. prather

## 2025-04-04 ENCOUNTER — NON-APPOINTMENT (OUTPATIENT)
Age: 25
End: 2025-04-04

## 2025-04-05 PROBLEM — L30.9 DERMATITIS, UNSPECIFIED: Chronic | Status: ACTIVE | Noted: 2025-03-31

## 2025-04-09 ENCOUNTER — APPOINTMENT (OUTPATIENT)
Dept: OBGYN | Facility: CLINIC | Age: 25
End: 2025-04-09
Payer: MEDICAID

## 2025-04-09 VITALS
OXYGEN SATURATION: 100 % | WEIGHT: 120 LBS | HEART RATE: 80 BPM | DIASTOLIC BLOOD PRESSURE: 88 MMHG | SYSTOLIC BLOOD PRESSURE: 131 MMHG | BODY MASS INDEX: 22.67 KG/M2

## 2025-04-09 PROCEDURE — 0503F POSTPARTUM CARE VISIT: CPT

## 2025-04-11 ENCOUNTER — APPOINTMENT (OUTPATIENT)
Dept: OBGYN | Facility: CLINIC | Age: 25
End: 2025-04-11
Payer: MEDICAID

## 2025-04-11 VITALS — WEIGHT: 117 LBS | DIASTOLIC BLOOD PRESSURE: 82 MMHG | SYSTOLIC BLOOD PRESSURE: 122 MMHG | BODY MASS INDEX: 22.11 KG/M2

## 2025-04-11 PROCEDURE — 0503F POSTPARTUM CARE VISIT: CPT

## 2025-05-15 ENCOUNTER — APPOINTMENT (OUTPATIENT)
Dept: OBGYN | Facility: CLINIC | Age: 25
End: 2025-05-15
Payer: MEDICAID

## 2025-05-15 ENCOUNTER — NON-APPOINTMENT (OUTPATIENT)
Age: 25
End: 2025-05-15

## 2025-05-15 VITALS
BODY MASS INDEX: 22.3 KG/M2 | SYSTOLIC BLOOD PRESSURE: 126 MMHG | HEART RATE: 74 BPM | WEIGHT: 118 LBS | DIASTOLIC BLOOD PRESSURE: 84 MMHG | OXYGEN SATURATION: 99 %

## 2025-05-15 PROCEDURE — 0503F POSTPARTUM CARE VISIT: CPT
